# Patient Record
Sex: MALE | Race: WHITE | NOT HISPANIC OR LATINO | Employment: OTHER | URBAN - METROPOLITAN AREA
[De-identification: names, ages, dates, MRNs, and addresses within clinical notes are randomized per-mention and may not be internally consistent; named-entity substitution may affect disease eponyms.]

---

## 2017-05-15 ENCOUNTER — APPOINTMENT (OUTPATIENT)
Dept: LAB | Facility: CLINIC | Age: 82
End: 2017-05-15
Payer: MEDICARE

## 2017-05-15 ENCOUNTER — TRANSCRIBE ORDERS (OUTPATIENT)
Dept: LAB | Facility: CLINIC | Age: 82
End: 2017-05-15

## 2017-05-15 DIAGNOSIS — C61 MALIGNANT NEOPLASM OF PROSTATE (HCC): Primary | ICD-10-CM

## 2017-05-15 DIAGNOSIS — C61 MALIGNANT NEOPLASM OF PROSTATE (HCC): ICD-10-CM

## 2017-05-15 LAB — PSA SERPL-MCNC: 36.8 NG/ML (ref 0–4)

## 2017-05-15 PROCEDURE — 84153 ASSAY OF PSA TOTAL: CPT

## 2017-05-25 ENCOUNTER — GENERIC CONVERSION - ENCOUNTER (OUTPATIENT)
Dept: OTHER | Facility: OTHER | Age: 82
End: 2017-05-25

## 2017-06-01 ENCOUNTER — TRANSCRIBE ORDERS (OUTPATIENT)
Dept: MRI IMAGING | Facility: CLINIC | Age: 82
End: 2017-06-01

## 2017-06-01 ENCOUNTER — APPOINTMENT (OUTPATIENT)
Dept: LAB | Facility: CLINIC | Age: 82
End: 2017-06-01
Payer: MEDICARE

## 2017-06-01 DIAGNOSIS — E11.9 TYPE 2 DIABETES MELLITUS WITHOUT COMPLICATIONS (HCC): ICD-10-CM

## 2017-06-01 DIAGNOSIS — D47.2 MONOCLONAL PARAPROTEINEMIA: Primary | ICD-10-CM

## 2017-06-01 DIAGNOSIS — D47.2 MONOCLONAL PARAPROTEINEMIA: ICD-10-CM

## 2017-06-01 DIAGNOSIS — I10 ESSENTIAL (PRIMARY) HYPERTENSION: ICD-10-CM

## 2017-06-01 DIAGNOSIS — E78.5 HYPERLIPIDEMIA: ICD-10-CM

## 2017-06-01 DIAGNOSIS — E03.9 HYPOTHYROIDISM: ICD-10-CM

## 2017-06-01 DIAGNOSIS — D47.2 MONOCLONAL GAMMOPATHY: ICD-10-CM

## 2017-06-01 LAB
ALBUMIN SERPL BCP-MCNC: 3.8 G/DL (ref 3.5–5)
ALP SERPL-CCNC: 102 U/L (ref 46–116)
ALT SERPL W P-5'-P-CCNC: 22 U/L (ref 12–78)
ANION GAP SERPL CALCULATED.3IONS-SCNC: 5 MMOL/L (ref 4–13)
AST SERPL W P-5'-P-CCNC: 24 U/L (ref 5–45)
BASOPHILS # BLD AUTO: 0.06 THOUSANDS/ΜL (ref 0–0.1)
BASOPHILS NFR BLD AUTO: 1 % (ref 0–1)
BILIRUB DIRECT SERPL-MCNC: 0.14 MG/DL (ref 0–0.2)
BILIRUB SERPL-MCNC: 0.4 MG/DL (ref 0.2–1)
BUN SERPL-MCNC: 34 MG/DL (ref 5–25)
CALCIUM SERPL-MCNC: 9.6 MG/DL (ref 8.3–10.1)
CHLORIDE SERPL-SCNC: 109 MMOL/L (ref 100–108)
CHOLEST SERPL-MCNC: 139 MG/DL (ref 50–200)
CO2 SERPL-SCNC: 27 MMOL/L (ref 21–32)
CREAT SERPL-MCNC: 1.52 MG/DL (ref 0.6–1.3)
EOSINOPHIL # BLD AUTO: 0.41 THOUSAND/ΜL (ref 0–0.61)
EOSINOPHIL NFR BLD AUTO: 6 % (ref 0–6)
ERYTHROCYTE [DISTWIDTH] IN BLOOD BY AUTOMATED COUNT: 13.9 % (ref 11.6–15.1)
EST. AVERAGE GLUCOSE BLD GHB EST-MCNC: 120 MG/DL
GFR SERPL CREATININE-BSD FRML MDRD: 43.8 ML/MIN/1.73SQ M
GLUCOSE P FAST SERPL-MCNC: 101 MG/DL (ref 65–99)
HBA1C MFR BLD: 5.8 % (ref 4.2–6.3)
HCT VFR BLD AUTO: 36 % (ref 36.5–49.3)
HDLC SERPL-MCNC: 43 MG/DL (ref 40–60)
HGB BLD-MCNC: 11.8 G/DL (ref 12–17)
LDLC SERPL CALC-MCNC: 77 MG/DL (ref 0–100)
LYMPHOCYTES # BLD AUTO: 1.59 THOUSANDS/ΜL (ref 0.6–4.47)
LYMPHOCYTES NFR BLD AUTO: 23 % (ref 14–44)
MCH RBC QN AUTO: 31.6 PG (ref 26.8–34.3)
MCHC RBC AUTO-ENTMCNC: 32.8 G/DL (ref 31.4–37.4)
MCV RBC AUTO: 97 FL (ref 82–98)
MONOCYTES # BLD AUTO: 0.74 THOUSAND/ΜL (ref 0.17–1.22)
MONOCYTES NFR BLD AUTO: 11 % (ref 4–12)
NEUTROPHILS # BLD AUTO: 4.13 THOUSANDS/ΜL (ref 1.85–7.62)
NEUTS SEG NFR BLD AUTO: 59 % (ref 43–75)
NRBC BLD AUTO-RTO: 0 /100 WBCS
PLATELET # BLD AUTO: 292 THOUSANDS/UL (ref 149–390)
PMV BLD AUTO: 10.2 FL (ref 8.9–12.7)
POTASSIUM SERPL-SCNC: 4.3 MMOL/L (ref 3.5–5.3)
PROT SERPL-MCNC: 8.9 G/DL (ref 6.4–8.2)
RBC # BLD AUTO: 3.73 MILLION/UL (ref 3.88–5.62)
SODIUM SERPL-SCNC: 141 MMOL/L (ref 136–145)
TRIGL SERPL-MCNC: 96 MG/DL
TSH SERPL DL<=0.05 MIU/L-ACNC: 5.06 UIU/ML (ref 0.36–3.74)
WBC # BLD AUTO: 6.95 THOUSAND/UL (ref 4.31–10.16)

## 2017-06-01 PROCEDURE — 36415 COLL VENOUS BLD VENIPUNCTURE: CPT

## 2017-06-01 PROCEDURE — 84165 PROTEIN E-PHORESIS SERUM: CPT

## 2017-06-01 PROCEDURE — 80061 LIPID PANEL: CPT

## 2017-06-01 PROCEDURE — 83883 ASSAY NEPHELOMETRY NOT SPEC: CPT

## 2017-06-01 PROCEDURE — 83036 HEMOGLOBIN GLYCOSYLATED A1C: CPT

## 2017-06-01 PROCEDURE — 80048 BASIC METABOLIC PNL TOTAL CA: CPT

## 2017-06-01 PROCEDURE — 84166 PROTEIN E-PHORESIS/URINE/CSF: CPT

## 2017-06-01 PROCEDURE — 80076 HEPATIC FUNCTION PANEL: CPT

## 2017-06-01 PROCEDURE — 84443 ASSAY THYROID STIM HORMONE: CPT

## 2017-06-01 PROCEDURE — 85025 COMPLETE CBC W/AUTO DIFF WBC: CPT

## 2017-06-02 ENCOUNTER — HOSPITAL ENCOUNTER (OUTPATIENT)
Dept: RADIOLOGY | Facility: CLINIC | Age: 82
Discharge: HOME | End: 2017-06-02
Payer: MEDICARE

## 2017-06-02 DIAGNOSIS — R07.81 PLEURODYNIA: ICD-10-CM

## 2017-06-02 LAB
ALBUMIN SERPL ELPH-MCNC: 4.42 G/DL (ref 3.5–5)
ALBUMIN SERPL ELPH-MCNC: 49.7 % (ref 52–65)
ALBUMIN UR ELPH-MCNC: 100 %
ALPHA1 GLOB MFR UR ELPH: 0 %
ALPHA1 GLOB SERPL ELPH-MCNC: 0.41 G/DL (ref 0.1–0.4)
ALPHA1 GLOB SERPL ELPH-MCNC: 4.6 % (ref 2.5–5)
ALPHA2 GLOB MFR UR ELPH: 0 %
ALPHA2 GLOB SERPL ELPH-MCNC: 1 G/DL (ref 0.4–1.2)
ALPHA2 GLOB SERPL ELPH-MCNC: 11.2 % (ref 7–13)
B-GLOBULIN MFR UR ELPH: 0 %
BETA GLOB ABNORMAL SERPL ELPH-MCNC: 0.47 G/DL (ref 0.4–0.8)
BETA1 GLOB SERPL ELPH-MCNC: 5.3 % (ref 5–13)
BETA2 GLOB SERPL ELPH-MCNC: 5.3 % (ref 2–8)
BETA2+GAMMA GLOB SERPL ELPH-MCNC: 0.47 G/DL (ref 0.2–0.5)
GAMMA GLOB ABNORMAL SERPL ELPH-MCNC: 2.13 G/DL (ref 0.5–1.6)
GAMMA GLOB MFR UR ELPH: 0 %
GAMMA GLOB SERPL ELPH-MCNC: 23.9 % (ref 12–22)
IGG/ALB SER: 0.99 {RATIO} (ref 1.1–1.8)
KAPPA LC FREE SER-MCNC: 142.46 MG/L (ref 3.3–19.4)
KAPPA LC FREE/LAMBDA FREE SER: 2.47 {RATIO} (ref 0.26–1.65)
LAMBDA LC FREE SERPL-MCNC: 57.62 MG/L (ref 5.71–26.3)
M PROTEIN 1 MFR SERPL ELPH: 6.2 %
M PROTEIN 1 SERPL ELPH-MCNC: 0.55 G/DL
PROT PATTERN UR ELPH-IMP: NORMAL
PROT SERPL-MCNC: 8.9 G/DL (ref 6.4–8.2)
PROT UR-MCNC: 10 MG/DL

## 2017-06-02 PROCEDURE — 71101 X-RAY EXAM UNILAT RIBS/CHEST: CPT

## 2017-06-13 ENCOUNTER — ALLSCRIPTS OFFICE VISIT (OUTPATIENT)
Dept: OTHER | Facility: OTHER | Age: 82
End: 2017-06-13

## 2017-07-18 ENCOUNTER — GENERIC CONVERSION - ENCOUNTER (OUTPATIENT)
Dept: OTHER | Facility: OTHER | Age: 82
End: 2017-07-18

## 2017-07-18 ENCOUNTER — GENERIC CONVERSION - ENCOUNTER (OUTPATIENT)
Dept: INTERNAL MEDICINE CLINIC | Facility: CLINIC | Age: 82
End: 2017-07-18

## 2017-08-29 ENCOUNTER — GENERIC CONVERSION - ENCOUNTER (OUTPATIENT)
Dept: OTHER | Facility: OTHER | Age: 82
End: 2017-08-29

## 2017-12-02 ENCOUNTER — APPOINTMENT (OUTPATIENT)
Dept: LAB | Facility: CLINIC | Age: 82
End: 2017-12-02
Payer: MEDICARE

## 2017-12-02 DIAGNOSIS — I10 ESSENTIAL (PRIMARY) HYPERTENSION: ICD-10-CM

## 2017-12-02 DIAGNOSIS — E78.5 HYPERLIPIDEMIA: ICD-10-CM

## 2017-12-02 DIAGNOSIS — D47.2 MONOCLONAL GAMMOPATHY: ICD-10-CM

## 2017-12-02 DIAGNOSIS — C61 MALIGNANT NEOPLASM OF PROSTATE (HCC): ICD-10-CM

## 2017-12-02 DIAGNOSIS — E03.9 HYPOTHYROIDISM: ICD-10-CM

## 2017-12-02 DIAGNOSIS — E11.9 TYPE 2 DIABETES MELLITUS WITHOUT COMPLICATIONS (HCC): ICD-10-CM

## 2017-12-02 LAB
ALBUMIN SERPL BCP-MCNC: 3.5 G/DL (ref 3.5–5)
ALP SERPL-CCNC: 96 U/L (ref 46–116)
ALT SERPL W P-5'-P-CCNC: 21 U/L (ref 12–78)
ANION GAP SERPL CALCULATED.3IONS-SCNC: 5 MMOL/L (ref 4–13)
AST SERPL W P-5'-P-CCNC: 24 U/L (ref 5–45)
BASOPHILS # BLD AUTO: 0.06 THOUSANDS/ΜL (ref 0–0.1)
BASOPHILS NFR BLD AUTO: 1 % (ref 0–1)
BILIRUB DIRECT SERPL-MCNC: 0.11 MG/DL (ref 0–0.2)
BILIRUB SERPL-MCNC: 0.39 MG/DL (ref 0.2–1)
BUN SERPL-MCNC: 19 MG/DL (ref 5–25)
CALCIUM SERPL-MCNC: 9.1 MG/DL (ref 8.3–10.1)
CHLORIDE SERPL-SCNC: 107 MMOL/L (ref 100–108)
CHOLEST SERPL-MCNC: 146 MG/DL (ref 50–200)
CO2 SERPL-SCNC: 26 MMOL/L (ref 21–32)
CREAT SERPL-MCNC: 1.33 MG/DL (ref 0.6–1.3)
EOSINOPHIL # BLD AUTO: 0.43 THOUSAND/ΜL (ref 0–0.61)
EOSINOPHIL NFR BLD AUTO: 7 % (ref 0–6)
ERYTHROCYTE [DISTWIDTH] IN BLOOD BY AUTOMATED COUNT: 14.1 % (ref 11.6–15.1)
EST. AVERAGE GLUCOSE BLD GHB EST-MCNC: 120 MG/DL
GFR SERPL CREATININE-BSD FRML MDRD: 48 ML/MIN/1.73SQ M
GLUCOSE P FAST SERPL-MCNC: 87 MG/DL (ref 65–99)
HBA1C MFR BLD: 5.8 % (ref 4.2–6.3)
HCT VFR BLD AUTO: 34.1 % (ref 36.5–49.3)
HDLC SERPL-MCNC: 47 MG/DL (ref 40–60)
HGB BLD-MCNC: 11.3 G/DL (ref 12–17)
IGG SERPL-MCNC: 1900 MG/DL (ref 700–1600)
LDLC SERPL CALC-MCNC: 76 MG/DL (ref 0–100)
LYMPHOCYTES # BLD AUTO: 1.47 THOUSANDS/ΜL (ref 0.6–4.47)
LYMPHOCYTES NFR BLD AUTO: 24 % (ref 14–44)
MCH RBC QN AUTO: 31.4 PG (ref 26.8–34.3)
MCHC RBC AUTO-ENTMCNC: 33.1 G/DL (ref 31.4–37.4)
MCV RBC AUTO: 95 FL (ref 82–98)
MONOCYTES # BLD AUTO: 0.6 THOUSAND/ΜL (ref 0.17–1.22)
MONOCYTES NFR BLD AUTO: 10 % (ref 4–12)
NEUTROPHILS # BLD AUTO: 3.5 THOUSANDS/ΜL (ref 1.85–7.62)
NEUTS SEG NFR BLD AUTO: 58 % (ref 43–75)
NRBC BLD AUTO-RTO: 0 /100 WBCS
PLATELET # BLD AUTO: 311 THOUSANDS/UL (ref 149–390)
PMV BLD AUTO: 9.8 FL (ref 8.9–12.7)
POTASSIUM SERPL-SCNC: 4.3 MMOL/L (ref 3.5–5.3)
PROT SERPL-MCNC: 8.7 G/DL (ref 6.4–8.2)
RBC # BLD AUTO: 3.6 MILLION/UL (ref 3.88–5.62)
SODIUM SERPL-SCNC: 138 MMOL/L (ref 136–145)
T4 FREE SERPL-MCNC: 0.86 NG/DL (ref 0.76–1.46)
TRIGL SERPL-MCNC: 115 MG/DL
TSH SERPL DL<=0.05 MIU/L-ACNC: 8.5 UIU/ML (ref 0.36–3.74)
WBC # BLD AUTO: 6.09 THOUSAND/UL (ref 4.31–10.16)

## 2017-12-02 PROCEDURE — 80061 LIPID PANEL: CPT

## 2017-12-02 PROCEDURE — 80048 BASIC METABOLIC PNL TOTAL CA: CPT

## 2017-12-02 PROCEDURE — 85025 COMPLETE CBC W/AUTO DIFF WBC: CPT

## 2017-12-02 PROCEDURE — 84166 PROTEIN E-PHORESIS/URINE/CSF: CPT

## 2017-12-02 PROCEDURE — 83036 HEMOGLOBIN GLYCOSYLATED A1C: CPT

## 2017-12-02 PROCEDURE — 84165 PROTEIN E-PHORESIS SERUM: CPT

## 2017-12-02 PROCEDURE — 82784 ASSAY IGA/IGD/IGG/IGM EACH: CPT

## 2017-12-02 PROCEDURE — 80076 HEPATIC FUNCTION PANEL: CPT

## 2017-12-02 PROCEDURE — 84443 ASSAY THYROID STIM HORMONE: CPT

## 2017-12-02 PROCEDURE — 86334 IMMUNOFIX E-PHORESIS SERUM: CPT

## 2017-12-02 PROCEDURE — 36415 COLL VENOUS BLD VENIPUNCTURE: CPT

## 2017-12-02 PROCEDURE — 84439 ASSAY OF FREE THYROXINE: CPT

## 2017-12-04 LAB
ALBUMIN SERPL ELPH-MCNC: 3.83 G/DL (ref 3.5–5)
ALBUMIN SERPL ELPH-MCNC: 47.3 % (ref 52–65)
ALPHA1 GLOB SERPL ELPH-MCNC: 0.39 G/DL (ref 0.1–0.4)
ALPHA1 GLOB SERPL ELPH-MCNC: 4.8 % (ref 2.5–5)
ALPHA2 GLOB SERPL ELPH-MCNC: 0.92 G/DL (ref 0.4–1.2)
ALPHA2 GLOB SERPL ELPH-MCNC: 11.3 % (ref 7–13)
BETA GLOB ABNORMAL SERPL ELPH-MCNC: 0.41 G/DL (ref 0.4–0.8)
BETA1 GLOB SERPL ELPH-MCNC: 5.1 % (ref 5–13)
BETA2 GLOB SERPL ELPH-MCNC: 6.1 % (ref 2–8)
BETA2+GAMMA GLOB SERPL ELPH-MCNC: 0.49 G/DL (ref 0.2–0.5)
GAMMA GLOB ABNORMAL SERPL ELPH-MCNC: 2.06 G/DL (ref 0.5–1.6)
GAMMA GLOB SERPL ELPH-MCNC: 25.4 % (ref 12–22)
IGG/ALB SER: 0.9 {RATIO} (ref 1.1–1.8)
INTERPRETATION UR IFE-IMP: NORMAL
M PROTEIN 1 MFR SERPL ELPH: 5.9 %
M PROTEIN 1 SERPL ELPH-MCNC: 0.48 G/DL
PROT SERPL-MCNC: 8.1 G/DL (ref 6.4–8.2)

## 2017-12-05 LAB
ALBUMIN UR ELPH-MCNC: 100 %
ALPHA1 GLOB MFR UR ELPH: 0 %
ALPHA2 GLOB MFR UR ELPH: 0 %
B-GLOBULIN MFR UR ELPH: 0 %
GAMMA GLOB MFR UR ELPH: 0 %
PROT PATTERN UR ELPH-IMP: NORMAL
PROT UR-MCNC: 10 MG/DL

## 2017-12-18 ENCOUNTER — ALLSCRIPTS OFFICE VISIT (OUTPATIENT)
Dept: OTHER | Facility: OTHER | Age: 82
End: 2017-12-18

## 2017-12-19 NOTE — PROGRESS NOTES
Assessment  1  Advance directive discussed with patient (V65 49) (Z71 89)   2  Atherosclerosis of coronary artery (414 00) (I25 10)   3  Chronic kidney disease (CKD) (585 9) (N18 9)   4  Chronic obstructive pulmonary disease (496) (J44 9)   5  Hyperlipidemia (272 4) (E78 5)   6  Hypertension (401 9) (I10)   7  MGUS (monoclonal gammopathy of unknown significance) (273 1) (D47 2)   8  Subclinical hypothyroidism (244 8) (E03 9)   9  Type 2 diabetes mellitus (250 00) (E11 9)    Plan  Health Maintenance    · Potassium Chloride Padmini ER 20 MEQ Oral Tablet Extended Release; Take 1 tablet daily  Hyperlipidemia    · (1) LIPID PANEL, FASTING; Status:Active; Requested CZU:[id number];   Hypertension    · Valsartan 80 MG Oral Tablet; take 1 tablet by mouth every day   · (1) BASIC METABOLIC PROFILE; Status:Active; Requested IGI:[id number];    · (1) CBC/PLT/DIFF; Status:Active; Requested VPI:[id number];    · (1) HEPATIC FUNCTION PANEL; Status:Active; Requested HWS:[id number];   MGUS (monoclonal gammopathy of unknown significance)    · (1) PROTEIN ELECTRO, SERUM; Status:Active; Requested IEH:[id number];    · (1) PROTEIN ELECTRO, URINE; Status:Active; Requested KFY:[id number];    · (Q) FREE KAPPA & LAMBDA WITH K/L RATIO, SERUM; Status:Active; Requested FYB:[id number]; Subclinical hypothyroidism    · (1) TSH; Status:Active; Requested XDD:[id number];   Type 2 diabetes mellitus    · (1) HEMOGLOBIN A1C; Status:Active; Requested DGH:[id number];    · (1) MICROALBUMIN CREATININE RATIO, RANDOM URINE; Status:Active; Requested CNH:[id number]; Discussion/Summary  Discussion Summary:   Lab data reviewed in detail and compared priorAdvanced dementia-I recommend contacting [facility] to check for available programs or wavers to get assistance  Signs and symptoms to watch for discussed   Assisted-living remains another optionCoronary artery disease stable, continue to modify risk factorsHypertension and hyperlipidemia stable on present regimenChronic kidney disease -serum creatinine is stableMGUS -labs reviewed and stableDiet-controlled type 2 diabetes remained stableRoutine follow-up 6 months, but sooner as neededPOLST and advanced directives discussed  Medication SE Review and Pt Understands Tx: Possible side effects of new medications were reviewed with the patient/guardian today  The treatment plan was reviewed with the patient/guardian  The patient/guardian understands and agrees with the treatment plan      Chief Complaint  Chief Complaint Chronic Condition  [place]: Patient is here today for follow up of chronic conditions described in HPI  History of Present Illness  HPI: Wife notes significant decline recently  Grandiose delusinos and paranoid behaviour, hiding things, rearranging things from freezer to closet  No aggressive behavior  No wandering  Some nights he sleeps, other times he is up at night  His daughter comes once per week to bath him and give Lesta Mon a respit  Pt is incontinent of bowel and bladder, but ambulates w/o asist  No falls  They never filled out POLST  Pt unable to give any reliable history  Review of Systems  Complete-Male:  Constitutional: No fever or chills, feels well, no tiredness, no recent weight gain or weight loss  Eyes: No complaints of eye pain, no red eyes, no discharge from eyes, no itchy eyes  ENT: no complaints of earache, no hearing loss, no nosebleeds, no nasal discharge, no sore throat, no hoarseness  Cardiovascular: No complaints of slow heart rate, no fast heart rate, no chest pain, no palpitations, no leg claudication, no lower extremity  Respiratory: shortness of breath during exertion, but-- no shortness of breath,-- no cough,-- no orthopnea,-- no wheezing-- and-- no PND  Gastrointestinal: constipation, but-- no abdominal pain,-- no nausea,-- no vomiting,-- no diarrhea-- and-- no blood in stools    Genitourinary: No complaints of dysuria, no incontinence, no hesitancy, no nocturia, no genital lesion, no testicular pain  Musculoskeletal: No complaints of arthralgia, no myalgias, no joint swelling or stiffness, no limb pain or swelling  Integumentary: No complaints of skin rash or skin lesions, no itching, no skin wound, no dry skin  Neurological: No compliants of headache, no confusion, no convulsions, no numbness or tingling, no dizziness or fainting, no limb weakness, no difficulty walking  Psychiatric: Is not suicidal, no sleep disturbances, no anxiety or depression, no change in personality, no emotional problems  Endocrine: No complaints of proptosis, no hot flashes, no muscle weakness, no erectile dysfunction, no deepening of the voice, no feelings of weakness  Hematologic/Lymphatic: No complaints of swollen glands, no swollen glands in the neck, does not bleed easily, no easy bruising  Active Problems  1  Acquired stenosis of aortic valve (424 1) (I35 0)   2  Acute pain of right knee (719 46) (M25 561)   3  Advance directive discussed with patient (V65 49) (Z71 89)   4  Aortic valve disorder (424 1) (I35 9)   5  Atherosclerosis of coronary artery (414 00) (I25 10)   6  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)   7  Chronic bronchitis (491 9) (J42)   8  Chronic kidney disease (CKD) (585 9) (N18 9)   9  Chronic obstructive pulmonary disease (496) (J44 9)   10  Constipation (564 00) (K59 00)   11  Coronary arteriosclerosis (414 00) (I25 10)   12  Difficulty breathing (786 09) (R06 89)   13  Elevated serum protein level (790 99) (R77 9)   14  Encounter for screening for osteoporosis (V82 81) (Z13 820)   15  Flu vaccine need (V04 81) (Z23)   16  Gait disturbance (781 2) (R26 9)   17  History of colonic polyps (V12 72) (Z86 010)   18  Hyperlipidemia (272 4) (E78 5)   19  Hypertension (401 9) (I10)   20  Left shoulder pain (719 41) (M25 512)   21  Lung nodule seen on imaging study (793 11) (R91 1)   22  MGUS (monoclonal gammopathy of unknown significance) (273 1) (D47 2)   23   Osteopenia (733 90) (M85 80)   24  Other nonspecific abnormal finding of lung field (793 19) (R91 8)   25  Prostate cancer (185) (C61)   26  Pulmonary emphysema (492 8) (J43 9)   27  Rectal bleeding (569 3) (K62 5)   28  Rib pain (786 50) (R07 81)   29  Screening for genitourinary condition (V81 6) (Z13 89)   30  Shortness of breath (786 05) (R06 02)   31  Subclinical hypothyroidism (244 8) (E03 9)   32  Tubular adenoma of colon (211 3) (D12 6)   33  Type 2 diabetes mellitus (250 00) (E11 9)    Past Medical History  1  History of Abnormal bleeding in menstrual cycle (626 9) (N93 9)   2  Aortic valve disorder (424 1) (I35 9)   3  Chronic obstructive pulmonary disease (496) (J44 9)   4  History of Flu vaccine need (V04 81) (Z23)   5  History of abnormal weight loss (V13 89) (Z87 898)   6  History of cataract (V12 49) (Z86 69)   7  History of hyperglycemia (V12 29) (Z86 39)   8  History of hypertension (V12 59) (Z86 79)   9  History of orthostatic hypotension (V12 59) (Z86 79)   10  Hyperlipidemia (272 4) (E78 5)   11  Personal history of coronary atherosclerosis (V12 59) (Z86 79)   12  History of Pulmonary disease (518 89) (J98 4)   13  History of Senile dementia, uncomplicated (121 2) (D94 22)   14  Type 2 diabetes mellitus (250 00) (E11 9)  Active Problems And Past Medical History Reviewed: The active problems and past medical history were reviewed and updated today  Surgical History  1  History of CABG   2  History of Cardiac Cath Procedure Outcome: Successful   3  History of Cataract Surgery   4  History of Gastric Surgery For Morbid Obesity  Surgical History Reviewed: The surgical history was reviewed and updated today  Family History  Mother    1  Family history of hyperlipidemia (V18 19) (Z83 49)   2  Family history of Father  At Age ___   3  Family history of Mother  At Age ___  Father    3  Family history of hyperlipidemia (V18 19) (Z83 49)  Daughter    5   Family history of thyroid disease (V18 19) (Z83 49)  Brother    6  Family history of skin cancer (V16 8) (Z80 8)  Family History    7  Family history of Cancer  Family History Reviewed: The family history was reviewed and updated today  Social History   · Former smoker (Z24 55) (Y66 358)   · Non smoker / tobacco user (V49 89) (Z78 9)   · Social alcohol use (Z78 9)  Social History Reviewed: The social history was reviewed and updated today  Current Meds   1  Atorvastatin Calcium 40 MG Oral Tablet; Take 1 tablet by mouth  daily; Therapy: 15TUD9221 to (Napoleon Ash)  Requested for: 48YEI2588; Last Rx:18Ofn0063 Ordered   2  Centrum Silver Oral Tablet; TAKE 1 TABLET DAILY; Therapy: (Recorded:08Jan2014) to Recorded   3  Furosemide 40 MG Oral Tablet (Lasix); TAKE 1 TABLET DAILY  Requested for: 61WUN4022; Last Rx:97Qww1025 Ordered   4  Ipratropium-Albuterol 0 5-2 5 (3) MG/3ML Inhalation Solution; USE 1 UNIT DOSE IN NEBULIZER 4 TIMES DAILY; Last Rx:25Rty3151 Ordered   5  Metoprolol Succinate ER 25 MG Oral Tablet Extended Release 24 Hour; Take 1 tablet by mouth  every day; Therapy: 23WRR3075 to (Evaluate:70Ibt4331)  Requested for: 87TBY3742; Last Rx:59Clc3512 Ordered   6  Potassium Chloride Padmini ER 20 MEQ Oral Tablet Extended Release; Take 1 tablet daily  Requested for: 59AKB4004; Last Rx:24Myq6283 Ordered   7  ProAir  (90 Base) MCG/ACT Inhalation Aerosol Solution; INHALE 1 TO 2 PUFFS EVERY 6 HOURS AS NEEDED  Requested for: 46ZIH0266; Last Rx:31Lyt2393 Ordered   8  Stool Softener 100 MG Oral Tablet; TAKE 1 TABLET DAILY AS DIRECTED; Therapy: (Recorded:08Jan2014) to Recorded   9  Valsartan 80 MG Oral Tablet; take 1 tablet by mouth every day; Therapy: 53ZMC9726 to (Last Rx:12Zlk6045)  Requested for: 25KYX2059 Ordered   10  Vitamin D 1000 UNIT Oral Tablet Recorded  Medication List Reviewed: The medication list was reviewed and updated today  Allergies  1  Exelon PT24   2  Namenda TABS   3   Nitrostat SUBL    Vitals  Vital Signs Recorded: 10DNX7695 03:08PM   Heart Rate 60   Respiration 18   Systolic 054   Diastolic 40   Height 6 ft    Weight 144 lb    BMI Calculated 19 53   BSA Calculated 1 85     Physical Exam   Constitutional  General appearance: Abnormal  -- elderly and chronically ill appearing  Eyes  Conjunctiva and lids: No swelling, erythema, or discharge  Pupils and irises: Equal, round and reactive to light  Ears, Nose, Mouth, and Throat  External inspection of ears and nose: Normal    Nasal mucosa, septum, and turbinates: Normal without edema or erythema  Oropharynx: Normal with no erythema, edema, exudate or lesions  Pulmonary  Respiratory effort: No increased work of breathing or signs of respiratory distress  Auscultation of lungs: Clear to auscultation, equal breath sounds bilaterally, no wheezes, no rales, no rhonci  Cardiovascular  Auscultation of heart: Abnormal  -- 3/6 jewel  Examination of extremities for edema and/or varicosities: Normal    Carotid pulses: Normal    Lymphatic  Palpation of lymph nodes in neck: No lymphadenopathy  Musculoskeletal  Gait and station: Normal    Skin  Skin and subcutaneous tissue: Normal without rashes or lesions  Neurologic  Cranial nerves: Cranial nerves 2-12 intact  Psychiatric  Orientation to person, place and time: Normal    Mood and affect: Normal    Diabetic Foot Screen: Normal    Socks and shoes removed, the Right Foot: the foot was normal, no swelling, no erythema  The toes on the right were normal   Socks and shoes removed, Left Foot: the foot was normal, no swelling, no erythema  The toes on the left were normal -- unable to assess sensation d/t dementia  Results/Data  *VB - Foot Exam 75NRY6616 03:06PM Alvina Hilario     Test Name Result Flag Reference   FOOT EXAM [id number]         Health Management  Constipation   COLONOSCOPY; every 5 years; Last 70ZPE8669; Next Due: 38LYC2356;  Active  Health Maintenance   *VB - Fall Risk Assessment  (Dx Z13 89 Screen for Neurologic Disorder); every 1 year; [id number]; Next Due: 76GHS6851;  Overdue    Signatures   Electronically signed by : NITHYA Weiner ; Dec 18 2017  3:58PM EST                       (Author)

## 2018-01-12 VITALS
RESPIRATION RATE: 18 BRPM | SYSTOLIC BLOOD PRESSURE: 124 MMHG | DIASTOLIC BLOOD PRESSURE: 52 MMHG | HEIGHT: 72 IN | HEART RATE: 64 BPM | WEIGHT: 156.5 LBS | BODY MASS INDEX: 21.2 KG/M2

## 2018-01-12 NOTE — MISCELLANEOUS
To Whom It May Concern:     L is a patient under my care for multiple medical comorbidities  He has dementia and is not suitable to sit on a jury  Electronically signed by:Shay HUBBARD    Mar 22 2016 12:21PM EST

## 2018-01-23 VITALS
WEIGHT: 144 LBS | BODY MASS INDEX: 19.5 KG/M2 | HEART RATE: 60 BPM | HEIGHT: 72 IN | DIASTOLIC BLOOD PRESSURE: 40 MMHG | SYSTOLIC BLOOD PRESSURE: 120 MMHG | RESPIRATION RATE: 18 BRPM

## 2018-01-26 ENCOUNTER — APPOINTMENT (INPATIENT)
Dept: CT IMAGING | Facility: HOSPITAL | Age: 83
DRG: 194 | End: 2018-01-26
Payer: MEDICARE

## 2018-01-26 ENCOUNTER — HOSPITAL ENCOUNTER (INPATIENT)
Facility: HOSPITAL | Age: 83
LOS: 3 days | Discharge: NURSING, REHAB OR LONG-TERM CARE | DRG: 194 | End: 2018-01-29
Attending: EMERGENCY MEDICINE | Admitting: FAMILY MEDICINE
Payer: MEDICARE

## 2018-01-26 ENCOUNTER — APPOINTMENT (EMERGENCY)
Dept: RADIOLOGY | Facility: HOSPITAL | Age: 83
DRG: 194 | End: 2018-01-26
Payer: MEDICARE

## 2018-01-26 DIAGNOSIS — K59.00 CONSTIPATION, UNSPECIFIED CONSTIPATION TYPE: ICD-10-CM

## 2018-01-26 DIAGNOSIS — J18.9 COMMUNITY ACQUIRED PNEUMONIA OF RIGHT UPPER LOBE OF LUNG: ICD-10-CM

## 2018-01-26 DIAGNOSIS — J18.9 RIGHT UPPER LOBE PNEUMONIA: Primary | ICD-10-CM

## 2018-01-26 DIAGNOSIS — M25.551 RIGHT HIP PAIN: ICD-10-CM

## 2018-01-26 PROBLEM — J44.9 COPD WITHOUT EXACERBATION (HCC): Status: ACTIVE | Noted: 2018-01-26

## 2018-01-26 PROBLEM — N17.9 ACUTE RENAL FAILURE (HCC): Status: ACTIVE | Noted: 2018-01-26

## 2018-01-26 PROBLEM — M25.559 HIP PAIN: Status: ACTIVE | Noted: 2018-01-26

## 2018-01-26 PROBLEM — F03.90 DEMENTIA (HCC): Status: ACTIVE | Noted: 2018-01-26

## 2018-01-26 PROBLEM — I25.10 CAD (CORONARY ARTERY DISEASE): Status: ACTIVE | Noted: 2018-01-26

## 2018-01-26 LAB
ALBUMIN SERPL BCP-MCNC: 3.3 G/DL (ref 3.5–5)
ALP SERPL-CCNC: 100 U/L (ref 46–116)
ALT SERPL W P-5'-P-CCNC: 37 U/L (ref 12–78)
ANION GAP SERPL CALCULATED.3IONS-SCNC: 13 MMOL/L (ref 4–13)
AST SERPL W P-5'-P-CCNC: 48 U/L (ref 5–45)
ATRIAL RATE: 102 BPM
BASOPHILS # BLD MANUAL: 0 THOUSAND/UL (ref 0–0.1)
BASOPHILS NFR MAR MANUAL: 0 % (ref 0–1)
BILIRUB SERPL-MCNC: 0.4 MG/DL (ref 0.2–1)
BILIRUB UR QL STRIP: NEGATIVE
BUN SERPL-MCNC: 38 MG/DL (ref 5–25)
CALCIUM SERPL-MCNC: 9.5 MG/DL (ref 8.3–10.1)
CHLORIDE SERPL-SCNC: 102 MMOL/L (ref 100–108)
CLARITY UR: CLEAR
CO2 SERPL-SCNC: 24 MMOL/L (ref 21–32)
COLOR UR: YELLOW
CREAT SERPL-MCNC: 1.66 MG/DL (ref 0.6–1.3)
EOSINOPHIL # BLD MANUAL: 0 THOUSAND/UL (ref 0–0.4)
EOSINOPHIL NFR BLD MANUAL: 0 % (ref 0–6)
ERYTHROCYTE [DISTWIDTH] IN BLOOD BY AUTOMATED COUNT: 13.3 % (ref 11.6–15.1)
GFR SERPL CREATININE-BSD FRML MDRD: 37 ML/MIN/1.73SQ M
GLUCOSE SERPL-MCNC: 143 MG/DL (ref 65–140)
GLUCOSE UR STRIP-MCNC: NEGATIVE MG/DL
HCT VFR BLD AUTO: 31.6 % (ref 36.5–49.3)
HGB BLD-MCNC: 10.4 G/DL (ref 12–17)
HGB UR QL STRIP.AUTO: NEGATIVE
KETONES UR STRIP-MCNC: NEGATIVE MG/DL
LACTATE SERPL-SCNC: 1.2 MMOL/L (ref 0.5–2)
LEUKOCYTE ESTERASE UR QL STRIP: NEGATIVE
LYMPHOCYTES # BLD AUTO: 11 % (ref 14–44)
LYMPHOCYTES # BLD AUTO: 2.17 THOUSAND/UL (ref 0.6–4.47)
MCH RBC QN AUTO: 31.5 PG (ref 26.8–34.3)
MCHC RBC AUTO-ENTMCNC: 32.9 G/DL (ref 31.4–37.4)
MCV RBC AUTO: 96 FL (ref 82–98)
MONOCYTES # BLD AUTO: 0.98 THOUSAND/UL (ref 0–1.22)
MONOCYTES NFR BLD: 5 % (ref 4–12)
NEUTROPHILS # BLD MANUAL: 16.54 THOUSAND/UL (ref 1.85–7.62)
NEUTS SEG NFR BLD AUTO: 84 % (ref 43–75)
NITRITE UR QL STRIP: NEGATIVE
NRBC BLD AUTO-RTO: 0 /100 WBCS
PH UR STRIP.AUTO: 5.5 [PH] (ref 4.5–8)
PLATELET # BLD AUTO: 320 THOUSANDS/UL (ref 149–390)
PLATELET BLD QL SMEAR: ADEQUATE
PMV BLD AUTO: 9.6 FL (ref 8.9–12.7)
POTASSIUM SERPL-SCNC: 4.2 MMOL/L (ref 3.5–5.3)
PROT SERPL-MCNC: 9.3 G/DL (ref 6.4–8.2)
PROT UR STRIP-MCNC: NEGATIVE MG/DL
QRS AXIS: -75 DEGREES
QRSD INTERVAL: 112 MS
QT INTERVAL: 354 MS
QTC INTERVAL: 465 MS
RBC # BLD AUTO: 3.3 MILLION/UL (ref 3.88–5.62)
SODIUM SERPL-SCNC: 139 MMOL/L (ref 136–145)
SP GR UR STRIP.AUTO: 1.01 (ref 1–1.03)
T WAVE AXIS: 75 DEGREES
TOTAL CELLS COUNTED SPEC: 100
TROPONIN I SERPL-MCNC: 0.02 NG/ML
TSH SERPL DL<=0.05 MIU/L-ACNC: 3.38 UIU/ML (ref 0.36–3.74)
UROBILINOGEN UR QL STRIP.AUTO: 0.2 E.U./DL
VENTRICULAR RATE: 104 BPM
WBC # BLD AUTO: 19.69 THOUSAND/UL (ref 4.31–10.16)

## 2018-01-26 PROCEDURE — 81003 URINALYSIS AUTO W/O SCOPE: CPT | Performed by: EMERGENCY MEDICINE

## 2018-01-26 PROCEDURE — 96374 THER/PROPH/DIAG INJ IV PUSH: CPT

## 2018-01-26 PROCEDURE — 73502 X-RAY EXAM HIP UNI 2-3 VIEWS: CPT

## 2018-01-26 PROCEDURE — 74176 CT ABD & PELVIS W/O CONTRAST: CPT

## 2018-01-26 PROCEDURE — 85007 BL SMEAR W/DIFF WBC COUNT: CPT | Performed by: EMERGENCY MEDICINE

## 2018-01-26 PROCEDURE — 93005 ELECTROCARDIOGRAM TRACING: CPT

## 2018-01-26 PROCEDURE — 83605 ASSAY OF LACTIC ACID: CPT | Performed by: EMERGENCY MEDICINE

## 2018-01-26 PROCEDURE — 80053 COMPREHEN METABOLIC PANEL: CPT | Performed by: EMERGENCY MEDICINE

## 2018-01-26 PROCEDURE — 71045 X-RAY EXAM CHEST 1 VIEW: CPT

## 2018-01-26 PROCEDURE — 93010 ELECTROCARDIOGRAM REPORT: CPT | Performed by: INTERNAL MEDICINE

## 2018-01-26 PROCEDURE — 36415 COLL VENOUS BLD VENIPUNCTURE: CPT | Performed by: EMERGENCY MEDICINE

## 2018-01-26 PROCEDURE — 84443 ASSAY THYROID STIM HORMONE: CPT | Performed by: EMERGENCY MEDICINE

## 2018-01-26 PROCEDURE — 96361 HYDRATE IV INFUSION ADD-ON: CPT

## 2018-01-26 PROCEDURE — 99223 1ST HOSP IP/OBS HIGH 75: CPT | Performed by: FAMILY MEDICINE

## 2018-01-26 PROCEDURE — 85027 COMPLETE CBC AUTOMATED: CPT | Performed by: EMERGENCY MEDICINE

## 2018-01-26 PROCEDURE — 87040 BLOOD CULTURE FOR BACTERIA: CPT | Performed by: EMERGENCY MEDICINE

## 2018-01-26 PROCEDURE — 84484 ASSAY OF TROPONIN QUANT: CPT | Performed by: EMERGENCY MEDICINE

## 2018-01-26 RX ORDER — ALBUTEROL SULFATE 90 UG/1
2 AEROSOL, METERED RESPIRATORY (INHALATION) EVERY 6 HOURS PRN
COMMUNITY
End: 2018-07-03

## 2018-01-26 RX ORDER — AZITHROMYCIN 250 MG/1
250 TABLET, FILM COATED ORAL EVERY 24 HOURS
Status: DISCONTINUED | OUTPATIENT
Start: 2018-01-27 | End: 2018-01-29 | Stop reason: HOSPADM

## 2018-01-26 RX ORDER — METOPROLOL SUCCINATE 25 MG/1
1 TABLET, EXTENDED RELEASE ORAL DAILY
COMMUNITY
Start: 2014-02-28 | End: 2018-01-29 | Stop reason: HOSPADM

## 2018-01-26 RX ORDER — ACETAMINOPHEN 325 MG/1
650 TABLET ORAL EVERY 6 HOURS PRN
Status: DISCONTINUED | OUTPATIENT
Start: 2018-01-26 | End: 2018-01-29 | Stop reason: HOSPADM

## 2018-01-26 RX ORDER — SODIUM CHLORIDE 9 MG/ML
75 INJECTION, SOLUTION INTRAVENOUS ONCE
Status: COMPLETED | OUTPATIENT
Start: 2018-01-26 | End: 2018-01-27

## 2018-01-26 RX ORDER — ONDANSETRON 2 MG/ML
4 INJECTION INTRAMUSCULAR; INTRAVENOUS EVERY 6 HOURS PRN
Status: DISCONTINUED | OUTPATIENT
Start: 2018-01-26 | End: 2018-01-29 | Stop reason: HOSPADM

## 2018-01-26 RX ORDER — POTASSIUM CHLORIDE 20 MEQ/1
1 TABLET, EXTENDED RELEASE ORAL DAILY
COMMUNITY

## 2018-01-26 RX ORDER — METOPROLOL SUCCINATE 25 MG/1
25 TABLET, EXTENDED RELEASE ORAL DAILY
Status: DISCONTINUED | OUTPATIENT
Start: 2018-01-27 | End: 2018-01-27

## 2018-01-26 RX ORDER — ASPIRIN 81 MG
1 TABLET, DELAYED RELEASE (ENTERIC COATED) ORAL DAILY
COMMUNITY

## 2018-01-26 RX ORDER — ACETAMINOPHEN 325 MG/1
975 TABLET ORAL ONCE
Status: COMPLETED | OUTPATIENT
Start: 2018-01-26 | End: 2018-01-26

## 2018-01-26 RX ORDER — MELATONIN
1000 DAILY
Status: DISCONTINUED | OUTPATIENT
Start: 2018-01-27 | End: 2018-01-29 | Stop reason: HOSPADM

## 2018-01-26 RX ORDER — FUROSEMIDE 40 MG/1
1 TABLET ORAL DAILY
COMMUNITY
Start: 2017-12-28

## 2018-01-26 RX ORDER — ATORVASTATIN CALCIUM 40 MG/1
1 TABLET, FILM COATED ORAL DAILY
COMMUNITY
Start: 2015-06-26

## 2018-01-26 RX ORDER — ATORVASTATIN CALCIUM 40 MG/1
40 TABLET, FILM COATED ORAL
Status: DISCONTINUED | OUTPATIENT
Start: 2018-01-26 | End: 2018-01-29 | Stop reason: HOSPADM

## 2018-01-26 RX ORDER — MORPHINE SULFATE 2 MG/ML
2 INJECTION, SOLUTION INTRAMUSCULAR; INTRAVENOUS ONCE
Status: COMPLETED | OUTPATIENT
Start: 2018-01-26 | End: 2018-01-26

## 2018-01-26 RX ORDER — VALSARTAN 80 MG/1
1 TABLET ORAL DAILY
COMMUNITY
Start: 2015-05-14 | End: 2018-07-03

## 2018-01-26 RX ORDER — ALBUTEROL SULFATE 90 UG/1
2 AEROSOL, METERED RESPIRATORY (INHALATION) EVERY 6 HOURS PRN
Status: DISCONTINUED | OUTPATIENT
Start: 2018-01-26 | End: 2018-01-29 | Stop reason: HOSPADM

## 2018-01-26 RX ORDER — HEPARIN SODIUM 5000 [USP'U]/ML
5000 INJECTION, SOLUTION INTRAVENOUS; SUBCUTANEOUS EVERY 8 HOURS SCHEDULED
Status: DISCONTINUED | OUTPATIENT
Start: 2018-01-26 | End: 2018-01-29 | Stop reason: HOSPADM

## 2018-01-26 RX ORDER — IPRATROPIUM BROMIDE AND ALBUTEROL SULFATE 2.5; .5 MG/3ML; MG/3ML
1 SOLUTION RESPIRATORY (INHALATION) 4 TIMES DAILY
COMMUNITY
End: 2018-07-03

## 2018-01-26 RX ADMIN — CEFTRIAXONE 1000 MG: 1 INJECTION, SOLUTION INTRAVENOUS at 16:34

## 2018-01-26 RX ADMIN — SODIUM CHLORIDE 75 ML/HR: 0.9 INJECTION, SOLUTION INTRAVENOUS at 17:55

## 2018-01-26 RX ADMIN — ACETAMINOPHEN 975 MG: 325 TABLET ORAL at 12:39

## 2018-01-26 RX ADMIN — MORPHINE SULFATE 2 MG: 2 INJECTION, SOLUTION INTRAMUSCULAR; INTRAVENOUS at 14:25

## 2018-01-26 RX ADMIN — AZITHROMYCIN MONOHYDRATE 500 MG: 500 INJECTION, POWDER, LYOPHILIZED, FOR SOLUTION INTRAVENOUS at 17:53

## 2018-01-26 RX ADMIN — HEPARIN SODIUM 5000 UNITS: 5000 INJECTION, SOLUTION INTRAVENOUS; SUBCUTANEOUS at 17:55

## 2018-01-26 RX ADMIN — ATORVASTATIN CALCIUM 40 MG: 40 TABLET, FILM COATED ORAL at 18:02

## 2018-01-26 RX ADMIN — SODIUM CHLORIDE 1000 ML: 0.9 INJECTION, SOLUTION INTRAVENOUS at 12:55

## 2018-01-26 NOTE — ASSESSMENT & PLAN NOTE
Acute renal failure chronic kidney disease stage 3  Will put the patient on gentle hydration and continue to monitor    I will hold the patient's diuretic as well as Diovan

## 2018-01-26 NOTE — H&P
H&P- Renetta Reeves 2/24/1932, 80 y o  male MRN: [id number]    [id number]    Primary Care Provider: Mago Eldridge MD   Date and time admitted to hospital: 1/26/2018 11:40 AM        CAD (coronary artery disease)   Assessment & Plan    Continue patient on his home medications        Dementia   Assessment & Plan    This appears to be advanced  Continue to monitor  There is no behavioral disturbance noted  Acute renal failure (HCC)   Assessment & Plan    Acute renal failure chronic kidney disease stage 3  Will put the patient on gentle hydration and continue to monitor        COPD without exacerbation ([facility] [address] )   Assessment & Plan    No acute exacerbation  Will put the patient on respiratory protocol        Hip pain   Assessment & Plan    Currently the patient did not have hip pain but he was unable to ambulate this morning but he did just receive pain medications  As long as the x-rays were fine we will ambulate the patient physical therapy  If he develops any hip pain or is unable to ambulate or bear weight we will consider further imaging and orthopedic evaluation  Continue with pain control for now  * Community acquired pneumonia   Assessment & Plan    Put the patient on ceftriaxone azithromycin  Follow up with the official x-ray report  Follow WBC count  Check a sputum if able  VTE Prophylaxis: Heparin  / sequential compression device   Code Status:  DNR level 3  POLST: POLST form is on file already (pre-hospital)  Discussion with family:  Discuss with family at the bedside    Anticipated Length of Stay:  Patient will be admitted on an Inpatient basis with an anticipated length of stay of  greater than 2 midnights     Justification for Hospital Stay:  Patient require greater than 2 midnights secondary to having community-acquired pneumonia with a significant leukocytosis as well as pelvic pain with difficulty with ambulation    Total Time for Visit, including Counseling / Coordination of Care:45 minutes     Greater than 50% of this total time spent on direct patient counseling and coordination of care  Chief Complaint:   Could not walk    History of Present Illness:    Korey Stone is a 80 y o  male who presents with difficulty walking  History is obtained from the patient's family at the bedside secondary to the patient having advanced dementia  According to the family the patient fell about 4-5 days ago  He fell down 4-5 carpeted steps  At that time he was doing fine and he was in his usual state of health however this morning when the wife tried to get him up he was unable to bear any weight  He complained of right hip pain earlier today and then when he was here he complained of left hip pain  He had difficulty with any kind of movement or flexion of the hip as well  The patient does have a cough and shortness of breath but family states that is his baseline  When asked about any urinary complaints the patient has been having increased frequency of urination lately  When asked about any fevers or chills the family states that he is always cold but no documented fevers were noted       Review of Systems:    Review of Systems   Constitutional: Positive for activity change, chills and fatigue  Respiratory: Positive for cough and shortness of breath  Genitourinary: Positive for frequency  Musculoskeletal:        Hip pain   Neurological: Positive for weakness  All other systems reviewed and are negative        Past Medical and Surgical History:     Past Medical History:   Diagnosis Date    CAD (coronary artery disease)     Cancer ([facility] )     CKD (chronic kidney disease) stage 3, GFR 30-59 ml/min     Emphysema lung (HCC)     HLD (hyperlipidemia)     HTN (hypertension)     Prostate cancer ([facility] )        Past Surgical History:   Procedure Laterality Date    CARDIAC SURGERY      EYE SURGERY      GASTRECTOMY         Meds/Allergies:    Prior to Admission medications    Medication Sig Start Date End Date Taking? Authorizing Provider   atorvastatin (LIPITOR) 40 mg tablet Take 1 tablet by mouth daily 6/26/15  Yes Historical Provider, MD   furosemide (LASIX) 40 mg tablet Take 1 tablet by mouth daily 12/28/17  Yes Historical Provider, MD   metoprolol succinate (TOPROL-XL) 25 mg 24 hr tablet Take 1 tablet by mouth daily 2/28/14  Yes Historical Provider, MD   valsartan (DIOVAN) 80 mg tablet Take 1 tablet by mouth daily 5/14/15  Yes Historical Provider, MD   albuterol (PROAIR HFA) 90 mcg/act inhaler Inhale 2 puffs every 6 (six) hours as needed    Historical Provider, MD   cholecalciferol (VITAMIN D3) 1,000 units tablet Take by mouth    Historical Provider, MD   Docusate Sodium 100 MG capsule Take 1 tablet by mouth daily    Historical Provider, MD   ipratropium-albuterol (DUO-NEB) 0 5-2 5 mg/3 mL Inhale 1 each 4 (four) times a day    Historical Provider, MD   Multiple Vitamins-Minerals (CENTRUM SILVER 50+MEN PO) Take 1 tablet by mouth daily    Historical Provider, MD   potassium chloride (K-DUR,KLOR-CON) 20 mEq tablet Take 1 tablet by mouth daily    Historical Provider, MD     I have reviewed home medications using allscripts  Allergies:    Allergies   Allergen Reactions    Nitroglycerin Dizziness and Nausea Only    Rivastigmine Dizziness and Nausea Only    Memantine Rash       Social History:     Marital Status: /Civil Union   Occupation:  Retired  Patient Pre-hospital Living Situation:  Lives with his wife  Patient Pre-hospital Level of Mobility:  Independent  Patient Pre-hospital Diet Restrictions:  None  Substance Use History:   History   Alcohol Use No     History   Smoking Status    Never Smoker   Smokeless Tobacco    Never Used     History   Drug Use No       Family History:    Family History   Problem Relation Age of Onset    No Known Problems Mother     No Known Problems Father        Physical Exam:     Vitals:   Blood Pressure: 114/53 (01/26/18 1422)  Pulse: 84 (01/26/18 1422)  Temperature: 98 5 °F (36 9 °C) (01/26/18 1156)  Respirations: 16 (01/26/18 1422)  Height: 6' 2" (188 cm) (01/26/18 1156)  Weight - Scale: 72 6 kg (160 lb) (01/26/18 1156)  SpO2: 95 % (01/26/18 1422)    Physical Exam    (   General Appearance:    Alert, not oriented to time or place  He is oriented to person   Head:    Normocephalic, without obvious abnormality, atraumatic   Eyes:    PERRL, conjunctiva/corneas clear, EOM's intact,             Nose:   Nares normal, septum midline, mucosa normal   Throat:   Lips, mucosa, and tongue normal; teeth and gums normal   Neck:   Supple, symmetrical, no adenopathy;        thyroid:  No enlargement/tenderness/nodules; no carotid    bruit or JVD   Back:     Symmetric, no curvature, ROM normal, no CVA tenderness   Lungs:     Clear to auscultation bilaterally, respirations unlabored       Heart:    Regular rate and rhythm, S1 and S2 normal, no murmur, rub    or gallop   Abdomen:     Soft, non-tender, bowel sounds active all four quadrants,     no masses, no organomegaly           Extremities:   Extremities normal, atraumatic, no cyanosis or edema   Pulses:   2+ and symmetric all extremities   Skin:   Skin color, texture, turgor normal, no rashes or lesions   Lymph nodes:   Cervical, supraclavicular, and axillary nodes normal   Neurologic:   CNII-XII intact  Normal strength, sensation and reflexes       throughout       Additional Data:     Lab Results: I have personally reviewed pertinent reports          Results from last 7 days  Lab Units 01/26/18  1254   WBC Thousand/uL 19 69*   HEMOGLOBIN g/dL 10 4*   HEMATOCRIT % 31 6*   PLATELETS Thousands/uL 320   LYMPHO PCT % 11*   MONO PCT MAN % 5   EOSINO PCT MANUAL % 0       Results from last 7 days  Lab Units 01/26/18  1254   SODIUM mmol/L 139   POTASSIUM mmol/L 4 2   CHLORIDE mmol/L 102   CO2 mmol/L 24   BUN mg/dL 38*   CREATININE mg/dL 1 66*   CALCIUM mg/dL 9 5   TOTAL PROTEIN g/dL 9 3* BILIRUBIN TOTAL mg/dL 0 40   ALK PHOS U/L 100   ALT U/L 37   AST U/L 48*   GLUCOSE RANDOM mg/dL 143*           Imaging: I have personally reviewed pertinent reports  XR chest 1 view portable   ED Interpretation by Padilla Glaser MD (01/26 1451)   Right upper lobe pneumonia  No free air  XR hip/pelv 2-3 vws right   ED Interpretation by Padilla Glaser MD (01/26 1450)   No fracture or dislocation  EKG, Pathology, and Other Studies Reviewed on Admission:   · Chest x-ray personally reviewed  Await official read  There is what seems to be a right upper lobe infiltrate    Allscripts / Epic Records Reviewed: Yes     ** Please Note: This note has been constructed using a voice recognition system   **

## 2018-01-26 NOTE — ASSESSMENT & PLAN NOTE
Acute renal failure chronic kidney disease stage 3    Will put the patient on gentle hydration and continue to monitor

## 2018-01-26 NOTE — ASSESSMENT & PLAN NOTE
Currently the patient did not have hip pain but he was unable to ambulate this morning but he did just receive pain medications  As long as the x-rays were fine we will ambulate the patient physical therapy  If he develops any hip pain or is unable to ambulate or bear weight we will consider further imaging and orthopedic evaluation  Continue with pain control for now

## 2018-01-26 NOTE — ASSESSMENT & PLAN NOTE
Put the patient on ceftriaxone azithromycin  Follow up with the official x-ray report  Follow WBC count  Check a sputum if able

## 2018-01-26 NOTE — ED PROVIDER NOTES
History  Chief Complaint   Patient presents with    Fall     pt fell 3 days ago c/o groin pain and inability to stand or walk today     Patient is an 80-year-old male  He has dementia  About 3 days ago he slipped on some stairs and fell onto his buttocks  He was able to get himself follow-up in actually walk up the stairs  He has done well over the last 3 days except today he started complaining of more pain to the right hip and was unable to stand or weight bear  He denied other injuries  Did not strike his head  No neck pain  No focal motor or sensory complaints  History is limited by dementia  Currently patient is without complaints  Prior to Admission Medications   Prescriptions Last Dose Informant Patient Reported? Taking? Docusate Sodium 100 MG capsule   Yes No   Sig: Take 1 tablet by mouth daily   Multiple Vitamins-Minerals (CENTRUM SILVER 50+MEN PO)   Yes No   Sig: Take 1 tablet by mouth daily   albuterol (PROAIR HFA) 90 mcg/act inhaler   Yes No   Sig: Inhale 2 puffs every 6 (six) hours as needed   atorvastatin (LIPITOR) 40 mg tablet   Yes Yes   Sig: Take 1 tablet by mouth daily   cholecalciferol (VITAMIN D3) 1,000 units tablet   Yes No   Sig: Take by mouth   furosemide (LASIX) 40 mg tablet   Yes Yes   Sig: Take 1 tablet by mouth daily   ipratropium-albuterol (DUO-NEB) 0 5-2 5 mg/3 mL   Yes No   Sig: Inhale 1 each 4 (four) times a day   metoprolol succinate (TOPROL-XL) 25 mg 24 hr tablet   Yes Yes   Sig: Take 1 tablet by mouth daily   potassium chloride (K-DUR,KLOR-CON) 20 mEq tablet   Yes No   Sig: Take 1 tablet by mouth daily   valsartan (DIOVAN) 80 mg tablet   Yes Yes   Sig: Take 1 tablet by mouth daily      Facility-Administered Medications: None       Past Medical History:   Diagnosis Date    [facility] ([facility] [address] )     [facility]        Past Surgical History:   Procedure Laterality Date    CARDIAC SURGERY      EYE SURGERY         History reviewed   No pertinent family history  I have reviewed and agree with the history as documented  Social History   Substance Use Topics    Smoking status: Never Smoker    Smokeless tobacco: Never Used    Alcohol use No        Review of Systems   Unable to perform ROS: Dementia       Physical Exam  ED Triage Vitals   Temperature Pulse Respirations Blood Pressure SpO2   01/26/18 1156 01/26/18 1156 01/26/18 1156 01/26/18 1156 01/26/18 1156   98 5 °F (36 9 °C) 98 18 (!) 90/47 95 %      Temp src Heart Rate Source Patient Position - Orthostatic VS BP Location FiO2 (%)   -- 01/26/18 1422 01/26/18 1422 01/26/18 1422 --    Monitor Lying Right arm       Pain Score       01/26/18 1156       No Pain           Orthostatic Vital Signs  Vitals:    01/26/18 1156 01/26/18 1422   BP: (!) 90/47 114/53   Pulse: 98 84   Patient Position - Orthostatic VS:  Lying       Physical Exam   Constitutional: He appears well-developed and well-nourished  No distress  HENT:   Head: Normocephalic and atraumatic  There is no palpable scalp step off or swelling  No lacerations  There is no facial bone tenderness  No swelling or step-offs  No crepitus  There is no LeFort fracture  No otorrhea  No rhinorrhea  No nasal deformity or epistaxis  There is no raccoon's or Yates's sign  Eyes: EOM are normal  Pupils are equal, round, and reactive to light  Globes are intact  No hyphema  Orbits are atraumatic  Neck:   There is no midline C-spine tenderness  Trachea is midline  There is no step-offs or swelling  No crepitus  No JVD  Cardiovascular: Normal rate, regular rhythm and intact distal pulses  Exam reveals no gallop and no friction rub  Murmur heard  Pulmonary/Chest: Effort normal and breath sounds normal  No stridor  No respiratory distress  He exhibits no tenderness  There is no bruising  No crepitus  Abdominal: Soft  Bowel sounds are normal  He exhibits no distension  There is no tenderness  There is no rebound and no guarding     Musculoskeletal: Normal range of motion  He exhibits no edema, tenderness or deformity  No thoracic or lumbar spine tenderness in the midline  No palpable hip tenderness  Patient is actually moving his right lower extremity spontaneously  He is able to adequately flex it without pain  He has good distal pulses  Neurological: He is alert  He has normal strength  No sensory deficit  GCS eye subscore is 4  GCS verbal subscore is 5  GCS motor subscore is 6  Skin: Skin is warm and dry  He is not diaphoretic  No pallor  Psychiatric: He has a normal mood and affect  Vitals reviewed  ED Medications  Medications   cefTRIAXone (ROCEPHIN) IVPB (premix) 1,000 mg (not administered)   azithromycin (ZITHROMAX) 500 mg in sodium chloride 0 9% 250mL IVPB 500 mg (not administered)   sodium chloride 0 9 % bolus 1,000 mL (0 mL Intravenous Stopped 1/26/18 1421)   acetaminophen (TYLENOL) tablet 975 mg (975 mg Oral Given 1/26/18 1239)   morphine injection 2 mg (2 mg Intravenous Given 1/26/18 1425)       Diagnostic Studies  Results Reviewed     Procedure Component Value Units Date/Time    Blood culture #1 [[id number]]     Lab Status:  No result Specimen:  Blood     Blood culture #2 [[id number]]     Lab Status:  No result Specimen:  Blood     Lactic acid, plasma [[id number]]     Lab Status:  No result Specimen:  Blood     TSH [[id number]]  (Normal) Collected:  01/26/18 1254    Lab Status:  Final result Specimen:  Blood from Arm, Right Updated:  01/26/18 1338     TSH 3RD GENERATON 3 382 uIU/mL     Narrative:         Patients undergoing fluorescein dye angiography may retain small amounts of fluorescein in the body for 48-72 hours post procedure  Samples containing fluorescein can produce falsely depressed TSH values  If the patient had this procedure,a specimen should be resubmitted post fluorescein clearance      Troponin I [[id number]]  (Normal) Collected:  01/26/18 1254    Lab Status:  Final result Specimen:  Blood from Arm, Right Updated:  01/26/18 2885 Troponin I 0 02 ng/mL     Narrative:         [employer] Chemistry analyzer 99% cutoff is > 0 04 ng/mL in network labs    o cTnI 99% cutoff is useful only when applied to patients in the clinical setting of myocardial ischemia  o cTnI 99% cutoff should be interpreted in the context of clinical history, ECG findings and possibly cardiac imaging to establish correct diagnosis  o cTnI 99% cutoff may be suggestive but clearly not indicative of a coronary event without the clinical setting of myocardial ischemia  Comprehensive metabolic panel [[id number]]  (Abnormal) Collected:  01/26/18 1254    Lab Status:  Final result Specimen:  Blood from Arm, Right Updated:  01/26/18 1331     Sodium 139 mmol/L      Potassium 4 2 mmol/L      Chloride 102 mmol/L      CO2 24 mmol/L      Anion Gap 13 mmol/L      BUN 38 (H) mg/dL      Creatinine 1 66 (H) mg/dL      Glucose 143 (H) mg/dL      Calcium 9 5 mg/dL      AST 48 (H) U/L      ALT 37 U/L      Alkaline Phosphatase 100 U/L      Total Protein 9 3 (H) g/dL      Albumin 3 3 (L) g/dL      Total Bilirubin 0 40 mg/dL      eGFR 37 ml/min/1 73sq m     Narrative:         National Kidney Disease Education Program recommendations are as follows:  GFR calculation is accurate only with a steady state creatinine  Chronic Kidney disease less than 60 ml/min/1 73 sq  meters  Kidney failure less than 15 ml/min/1 73 sq  meters      CBC and differential [[id number]]  (Abnormal) Collected:  01/26/18 1254    Lab Status:  Final result Specimen:  Blood from Arm, Right Updated:  01/26/18 1328     WBC 19 69 (H) Thousand/uL      RBC 3 30 (L) Million/uL      Hemoglobin 10 4 (L) g/dL      Hematocrit 31 6 (L) %      MCV 96 fL      MCH 31 5 pg      MCHC 32 9 g/dL      RDW 13 3 %      MPV 9 6 fL      Platelets 706 Thousands/uL      nRBC 0 /100 WBCs     UA w Reflex to Microscopic [[id number]]     Lab Status:  No result Specimen:  Urine                  XR chest 1 view portable   ED Interpretation by Pradeep Garza MD (01/26 1023)   Right upper lobe pneumonia  No free air  XR hip/pelv 2-3 vws right   ED Interpretation by Brock Quiñonez MD (01/26 9121)   No fracture or dislocation  Procedures  Procedures       Phone Contacts  ED Phone Contact    ED Course  ED Course                                MDM  Number of Diagnoses or Management Options  Diagnosis management comments: There does not appear to be any fracture to the hip or pelvis  Patient's blood pressure was on the low side when he arrived  This did respond readily to IV fluids  Is BUN and creatinine were elevated  I suspect patient might have been dry  Patient does have an elevated white blood cell count  It is 19,000  He is afebrile  Family reports that he has been coughing  His chest x-ray shows a right upper lobe pneumonia  Urinalysis is still pending  Patient was cultured  Empiric antibiotics for community-acquired pneumonia were ordered  Consulted with hospitalist for admission  Amount and/or Complexity of Data Reviewed  Clinical lab tests: ordered and reviewed  Tests in the radiology section of CPT®: reviewed and ordered  Discuss the patient with other providers: yes  Independent visualization of images, tracings, or specimens: yes      CritCare Time    Disposition  Final diagnoses:   Right upper lobe pneumonia ([address] )   Right hip pain     Time reflects when diagnosis was documented in both MDM as applicable and the Disposition within this note     Time User Action Codes Description Comment    1/26/2018  2:52 PM Debbi Turner Add [J18 1] Right upper lobe pneumonia ([address] )     1/26/2018  2:53 PM Debbi Mirza [M25 551] Right hip pain       ED Disposition     ED Disposition Condition Comment    Admit        Follow-up Information    None       Patient's Medications   Discharge Prescriptions    No medications on file     No discharge procedures on file      ED Provider  Electronically Signed by           Brock Quiñonez MD  01/26/18 1457
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
## 2018-01-27 PROBLEM — I50.22 CHRONIC SYSTOLIC CHF (CONGESTIVE HEART FAILURE) (HCC): Status: ACTIVE | Noted: 2018-01-27

## 2018-01-27 PROBLEM — K59.00 CONSTIPATION: Status: ACTIVE | Noted: 2018-01-27

## 2018-01-27 PROBLEM — I35.0 MODERATE AORTIC STENOSIS: Status: ACTIVE | Noted: 2018-01-27

## 2018-01-27 PROBLEM — D64.9 ANEMIA: Status: ACTIVE | Noted: 2018-01-27

## 2018-01-27 LAB
ANION GAP SERPL CALCULATED.3IONS-SCNC: 9 MMOL/L (ref 4–13)
BUN SERPL-MCNC: 32 MG/DL (ref 5–25)
CALCIUM SERPL-MCNC: 9 MG/DL (ref 8.3–10.1)
CHLORIDE SERPL-SCNC: 106 MMOL/L (ref 100–108)
CO2 SERPL-SCNC: 24 MMOL/L (ref 21–32)
CREAT SERPL-MCNC: 1.55 MG/DL (ref 0.6–1.3)
ERYTHROCYTE [DISTWIDTH] IN BLOOD BY AUTOMATED COUNT: 13.2 % (ref 11.6–15.1)
GFR SERPL CREATININE-BSD FRML MDRD: 40 ML/MIN/1.73SQ M
GLUCOSE SERPL-MCNC: 134 MG/DL (ref 65–140)
HCT VFR BLD AUTO: 29 % (ref 36.5–49.3)
HGB BLD-MCNC: 9.6 G/DL (ref 12–17)
MAGNESIUM SERPL-MCNC: 2.2 MG/DL (ref 1.6–2.6)
MCH RBC QN AUTO: 31.6 PG (ref 26.8–34.3)
MCHC RBC AUTO-ENTMCNC: 33.1 G/DL (ref 31.4–37.4)
MCV RBC AUTO: 95 FL (ref 82–98)
PLATELET # BLD AUTO: 308 THOUSANDS/UL (ref 149–390)
PMV BLD AUTO: 9.4 FL (ref 8.9–12.7)
POTASSIUM SERPL-SCNC: 4.8 MMOL/L (ref 3.5–5.3)
RBC # BLD AUTO: 3.04 MILLION/UL (ref 3.88–5.62)
SODIUM SERPL-SCNC: 139 MMOL/L (ref 136–145)
WBC # BLD AUTO: 14.07 THOUSAND/UL (ref 4.31–10.16)

## 2018-01-27 PROCEDURE — 94664 DEMO&/EVAL PT USE INHALER: CPT

## 2018-01-27 PROCEDURE — 36415 COLL VENOUS BLD VENIPUNCTURE: CPT | Performed by: FAMILY MEDICINE

## 2018-01-27 PROCEDURE — 99285 EMERGENCY DEPT VISIT HI MDM: CPT

## 2018-01-27 PROCEDURE — 99233 SBSQ HOSP IP/OBS HIGH 50: CPT | Performed by: INTERNAL MEDICINE

## 2018-01-27 PROCEDURE — 83735 ASSAY OF MAGNESIUM: CPT | Performed by: FAMILY MEDICINE

## 2018-01-27 PROCEDURE — 85027 COMPLETE CBC AUTOMATED: CPT | Performed by: FAMILY MEDICINE

## 2018-01-27 PROCEDURE — 80048 BASIC METABOLIC PNL TOTAL CA: CPT | Performed by: FAMILY MEDICINE

## 2018-01-27 PROCEDURE — 97163 PT EVAL HIGH COMPLEX 45 MIN: CPT

## 2018-01-27 PROCEDURE — G8979 MOBILITY GOAL STATUS: HCPCS

## 2018-01-27 PROCEDURE — G8978 MOBILITY CURRENT STATUS: HCPCS

## 2018-01-27 RX ORDER — POLYETHYLENE GLYCOL 3350 17 G/17G
17 POWDER, FOR SOLUTION ORAL DAILY
Status: DISCONTINUED | OUTPATIENT
Start: 2018-01-27 | End: 2018-01-29 | Stop reason: HOSPADM

## 2018-01-27 RX ADMIN — HEPARIN SODIUM 5000 UNITS: 5000 INJECTION, SOLUTION INTRAVENOUS; SUBCUTANEOUS at 21:19

## 2018-01-27 RX ADMIN — Medication 1 TABLET: at 08:52

## 2018-01-27 RX ADMIN — CHOLECALCIFEROL TAB 25 MCG (1000 UNIT) 1000 UNITS: 25 TAB at 08:52

## 2018-01-27 RX ADMIN — ACETAMINOPHEN 650 MG: 325 TABLET ORAL at 21:19

## 2018-01-27 RX ADMIN — ATORVASTATIN CALCIUM 40 MG: 40 TABLET, FILM COATED ORAL at 16:30

## 2018-01-27 RX ADMIN — CEFTRIAXONE 1000 MG: 1 INJECTION, SOLUTION INTRAVENOUS at 16:30

## 2018-01-27 RX ADMIN — POLYETHYLENE GLYCOL 3350 17 G: 17 POWDER, FOR SOLUTION ORAL at 11:00

## 2018-01-27 RX ADMIN — HEPARIN SODIUM 5000 UNITS: 5000 INJECTION, SOLUTION INTRAVENOUS; SUBCUTANEOUS at 05:40

## 2018-01-27 RX ADMIN — HEPARIN SODIUM 5000 UNITS: 5000 INJECTION, SOLUTION INTRAVENOUS; SUBCUTANEOUS at 16:30

## 2018-01-27 RX ADMIN — AZITHROMYCIN 250 MG: 250 TABLET, FILM COATED ORAL at 08:52

## 2018-01-27 NOTE — PLAN OF CARE
Problem: PHYSICAL THERAPY ADULT  Goal: Performs mobility at highest level of function for planned discharge setting  See evaluation for individualized goals  Treatment/Interventions: Functional transfer training, LE strengthening/ROM, Elevations, Therapeutic exercise, Endurance training, Patient/family training, Bed mobility, Gait training, Equipment eval/education, Spoke to nursing, Family          See flowsheet documentation for full assessment, interventions and recommendations  Prognosis: Fair  Problem List: Decreased strength, Decreased endurance, Impaired balance, Decreased mobility, Decreased cognition, Impaired judgement, Decreased safety awareness, Impaired hearing  Assessment: pt is an 84y/o m who presents to [facility] c R hip pain s/p fall 4-5 days ago  family reports ambulatory dysfunction + advanced dementia  imaging (-) for acute fx, however noted age indeterminate T12/L2 compression fxs  PMH significant for COPD, dementia, renal failure, CAD, CHF, + anemia  pt unreliable historian oriented only to person during PT eval  pt's dtr at bedside to provide hx  at baseline, pt ambulates c RW mod (I), however dtr reports pt non-compliant c RW  pt resides c spouse in bi-level home c 8+6 steps btwn levels of home  dtr reports spouse occassionally needs to (A) pt c ADLs 2* advanced dementia  pt currently requires mod-max (A)x2 for functional mobility tasks 2* deficits in strength, balance, gait quality, cognition, + activity tolerance noted in PT exam above  Barthel Index 20/100  max verbal cues required for initiation of tasks c pt inconsistent c following commands  initiated ambulation c RW 1' fwd + 1' bkwd along c marches in place c max cues for technique  pt also incontinent of stool during session  returned to supine for pericare at end of session c dtr at bedside  would benefit from skilled PT to maximize functional mobility + improve quality of life  upon d/c, recommend STR   PT eval of high complexity 2* unstable med status c pt requiring ongoing medical management s/p fall + PNA  pt c multiple co-morbidities including advanced dementia impacting PT  presents c significant decline in mobility from baseline requiring mod-max (A)x2 for functional mobility tasks 2* mobility deficits above  resides c spouse in bi-level home c 8+6 steps btwn levels + has h/o falls  Barriers to Discharge: Inaccessible home environment, Decreased caregiver support (? ability to spouse to cont to care for pt)     Recommendation: Short-term skilled PT     PT - OK to Discharge: Yes (to STR )    See flowsheet documentation for full assessment

## 2018-01-27 NOTE — PHYSICAL THERAPY NOTE
PT Evaluation (30min)  (12:00-12:30)    Past Medical History:   Diagnosis Date    CAD (coronary artery disease)     [facility] ([facility] [address] )     CKD (chronic kidney disease) stage 3, GFR 30-59 ml/min     Emphysema lung (HCC)     HLD (hyperlipidemia)     HTN (hypertension)     Prostate cancer ([facility] [address] )       01/27/18 1230   Note Type   Note type Eval only   Pain Assessment   Pain Assessment FLACC   Pain Rating: FLACC (Rest) - Face 0   Pain Rating: FLACC (Rest) - Legs 0   Pain Rating: FLACC (Rest) - Activity 0   Pain Rating: FLACC (Rest) - Cry 0   Pain Rating: FLACC (Rest) - Consolability 0   Score: FLACC (Rest) 0   Pain Rating: FLACC (Activity) - Face 0   Pain Rating: FLACC (Activity) - Legs 0   Pain Rating: FLACC (Activity) - Activity 0   Pain Rating: FLACC (Activity) - Cry 0   Pain Rating: FLACC (Activity) - Consolability 0   Score: FLACC (Activity) 0   Home Living   Type of Home House   Home Layout Two level;Stairs to enter with rails  (bi-level; 8+6 steps btwn levels c rail)   [address] [place] [address] chair   Home Equipment Walker; Other (Comment)  (home O2 (2L) at night)   Prior Function   Level of [place] Independent with ADLs and functional mobility  (dtr reports pt non-compliant c RW)   Lives With Spouse   Receives Help From Family  (dtr is RN)   ADL Assistance Needs assistance  (dtr reports needs (A) 2* dementia)   IADLs Needs assistance   Falls in the last 6 months 1 to 4   Vocational Retired   Comments pt poor historian  oriented only to person  dtr at bedside to (A) c social hx  reports pt ambulatory at baseline, however non-compliant c RW  Restrictions/Precautions   Other Precautions Cognitive; Chair Alarm; Bed Alarm; Fall Risk;Hard of hearing   General   Additional Pertinent History pt presents to [facility] c ambulatory dysfunction s/p fall 4-5 days PTA  imaging (-) for acute fx despite hip pain  imaging (+) for age indeterminate compression fxs T12/L2  PT consulted for mobility + d/c planning  up c (A)  Family/Caregiver Present Yes  (pt's dtr)   Cognition   Orientation Level Oriented to person   Following Commands Follows one step commands inconsistently   RUE Assessment   RUE Assessment WFL  (4-/5)   LUE Assessment   LUE Assessment WFL  (4-/5)   RLE Assessment   RLE Assessment WFL  (4-/5)   LLE Assessment   LLE Assessment WFL  (4-/5)   Coordination   Sensation WFL   Bed Mobility   Rolling R 2  Maximal assistance   Additional items Assist x 2;Bedrails;Verbal cues; Increased time required   Rolling L 2  Maximal assistance   Additional items Assist x 2;Bedrails; Increased time required;Verbal cues   Supine to Sit 2  Maximal assistance   Additional items Assist x 2;HOB elevated; Increased time required;Verbal cues;LE management   Sit to Supine 2  Maximal assistance   Additional items Assist x 2; Increased time required;Verbal cues;LE management   Transfers   Sit to Stand 3  Moderate assistance   Additional items Assist x 2;Verbal cues; Increased time required   Stand to Sit 3  Moderate assistance   Additional items Assist x 2;Verbal cues; Increased time required   Ambulation/Elevation   Gait pattern Narrow ARMINDA; Forward Flexion;Decreased foot clearance; Short stride   Gait Assistance 3  Moderate assist   Additional items Assist x 2;Verbal cues   Assistive Device Rolling walker   Distance 1' fwd, 1' bkwd; marching in place   Balance   Static Sitting Poor +   Dynamic Sitting Poor   Static Standing Poor   Dynamic Standing Poor   Ambulatory Poor   Activity Tolerance   Activity Tolerance Patient limited by fatigue   Nurse Made Aware [facility]   Assessment   Prognosis Fair   Problem List Decreased strength;Decreased endurance; Impaired balance;Decreased mobility; Decreased cognition; Impaired judgement;Decreased safety awareness; Impaired hearing   Assessment pt is an 86y/o m who presents to [facility] c R hip pain s/p fall 4-5 days ago   family reports ambulatory dysfunction + advanced dementia  imaging (-) for acute fx, however noted age indeterminate T12/L2 compression fxs  PMH significant for COPD, dementia, renal failure, CAD, CHF, + anemia  pt unreliable historian oriented only to person during PT eval  pt's dtr at bedside to provide hx  at baseline, pt ambulates c RW mod (I), however dtr reports pt non-compliant c RW  pt resides c spouse in bi-level home c 8+6 steps btwn levels of home  dtr reports spouse occassionally needs to (A) pt c ADLs 2* advanced dementia  pt currently requires mod-max (A)x2 for functional mobility tasks 2* deficits in strength, balance, gait quality, cognition, + activity tolerance noted in PT exam above  Barthel Index 20/100  max verbal cues required for initiation of tasks c pt inconsistent c following commands  initiated ambulation c RW 1' fwd + 1' bkwd along c marches in place c max cues for technique  pt also incontinent of stool during session  returned to supine for pericare at end of session c dtr at bedside  would benefit from skilled PT to maximize functional mobility + improve quality of life  upon d/c, recommend STR  PT eval of high complexity 2* unstable med status c pt requiring ongoing medical management s/p fall + PNA  pt c multiple co-morbidities including advanced dementia impacting PT  presents c significant decline in mobility from baseline requiring mod-max (A)x2 for functional mobility tasks 2* mobility deficits above  resides c spouse in bi-level home c 8+6 steps btwn levels + has h/o falls  Barriers to Discharge Inaccessible home environment;Decreased caregiver support  (? ability to spouse to cont to care for pt)   Goals   Patient Goals none stated   STG Expiration Date 02/06/18   Short Term Goal #1 1   increase strength 1/2 grade to improve overall functional mobility, 2  perform bed mobility min (A)x1 to sit up + visit c family, 3  safely perform transfers min (A)x1 to perform ADLs, 4  ambulate 150' c RW min (A)x1 to safely navigate home environment, 5  negotiate 8 stairs min (A)x1 to access 2nd floor of home   Plan   Treatment/Interventions Functional transfer training;LE strengthening/ROM; Elevations; Therapeutic exercise; Endurance training;Patient/family training;Bed mobility;Gait training;Equipment eval/education;Spoke to nursing;Family   PT Frequency 5x/wk   Recommendation   Recommendation Short-term skilled PT   PT - OK to Discharge Yes  (to STR )   Barthel Index   Feeding 5   Bathing 0   Grooming Score 0   Dressing Score 5   Bladder Score 0   Bowels Score 0   Toilet Use Score 5   Transfers (Bed/Chair) Score 5   Mobility (Level Surface) Score 0   Stairs Score 0   Barthel Index Score 20     Wendy Valladares, PT

## 2018-01-27 NOTE — RESPIRATORY THERAPY NOTE
RT Protocol Note  Jesus Fuller 80 y o  male MRN: [id number]  Unit/Bed#: ED 09 Encounter: [id number]    Assessment    Principal Problem:    Community acquired pneumonia  Active Problems:    Hip pain    COPD without exacerbation (HCC)    Acute renal failure (HCC)    Dementia    CAD (coronary artery disease)    Moderate aortic stenosis    Chronic systolic CHF (congestive heart failure) (HCC)    Falls    Anemia    Constipation      Home Pulmonary Medications:  Duoneb    Past Medical History:   Diagnosis Date    CAD (coronary artery disease)     [facility]     CKD (chronic kidney disease) stage 3, GFR 30-59 ml/min     Emphysema lung (HCC)     HLD (hyperlipidemia)     HTN (hypertension)     Prostate cancer ([facility] )      Social History     Social History    Marital status: /Civil Union     Spouse name: N/A    Number of children: N/A    Years of education: N/A     Social History Main Topics    Smoking status: Never Smoker    Smokeless tobacco: Never Used    Alcohol use No    Drug use: No    Sexual activity: Not Asked     Other Topics Concern    None     Social History Narrative    None       Subjective         Objective    Physical Exam:   Assessment Type: Assess only  General Appearance: Awake  Respiratory Pattern: Normal  Chest Assessment: Chest expansion symmetrical  Bilateral Breath Sounds: Diminished    Vitals:  Blood pressure 93/50, pulse 83, temperature 98 4 °F (36 9 °C), temperature source Oral, resp  rate 16, height 6' 2" (1 88 m), weight 72 6 kg (160 lb), SpO2 94 %  Imaging and other studies: I have personally reviewed pertinent reports  Plan    Respiratory Plan: No distress/Pulmonary history        Resp Comments: (P) Pt  was assessed as per protocol  Pt  does not appear to be in any respiratory distress  Pt  family satated that he uses Duoneb PRN at home and O2 for sleep  pt  will continue to be on Duoneb PRN  We will continue to monitor for any change in condition

## 2018-01-27 NOTE — RESPIRATORY THERAPY NOTE
Pt has dementia and is refusing to participate with the resp protocol at this time  Will let day shift know to see if they can try again when family is around to answer questions

## 2018-01-27 NOTE — PROGRESS NOTES
Progress Note - Supriya Gambino 80 y o  male MRN: [id number]  Unit/Bed#: ED 09 Encounter: [id number]    Subjective:   Patient is sleeping at the time of my arrival   He is easily arousable with tactile stimuli  He is alert, cooperative and quite pleasant however his history is completely unreliable due to his dementia  His review of systems is 100% negative  He does not know where he is or why he is here  His daughter, Charmayne Honey, is at the bedside and states he slept fairly well overnight  He has been coughing  She is concerned with the CT scan results because he had pain in his groin and she is concern for a fracture  She also notes he has had long history of constipation with intermittent fecal smearing  All other ROS are negative  Objective:   Vitals: Blood pressure 93/50, pulse 83, temperature 98 4 °F (36 9 °C), temperature source Oral, resp  rate 16, height 6' 2" (1 88 m), weight 72 6 kg (160 lb), SpO2 94 %  ,Body mass index is 20 54 kg/m²  SPO2 RA Rest    [facility] ED from 1/26/2018 in [address] [place] [address] Emergency Department   SpO2  94 %   SpO2 Activity  At Rest   O2 Device  None (Room air)   O2 Flow Rate  No data        I&O: No intake or output data in the 24 hours ending 01/27/18 1027      Physical Exam:       General Appearance:    Alert, oriented only to person, cooperative, no distress   Head:    Normocephalic, without obvious abnormality, atraumatic   Eyes:    PERRL, conjunctiva/corneas clear, EOM's intact       Nose:   Moist mucous membranes, no drainage or sinus tenderness   Throat:   No tenderness, no exudates   Neck:   Supple, symmetrical, trachea midline, no JVD   Lungs:     Crackles at left base, respirations unlabored   Heart:    Regular rate and rhythm, S1 and S2 normal, 2/6 holosystolic murmur, no rub   or gallop   Abdomen: Soft, non-tender, positive bowel sounds, no masses, no organomegaly   Extremities:  No pedal edema, calf tenderness   Pelvis is stable, no tenderness at the hip or groin  Distal pulses palpable           Invasive Devices     Peripheral Intravenous Line            Peripheral IV 01/26/18 Right Antecubital less than 1 day                      Social History  reviewed  Family History   Problem Relation Age of Onset    No Known Problems Mother     No Known Problems Father     reviewed    Meds:  Current Facility-Administered Medications   Medication Dose Route Frequency Provider Last Rate Last Dose    acetaminophen (TYLENOL) tablet 650 mg  650 mg Oral Q6H PRN Shorty Post MD        albuterol (PROVENTIL HFA,VENTOLIN HFA) inhaler 2 puff  2 puff Inhalation Q6H PRN Shorty Post MD        atorvastatin (LIPITOR) tablet 40 mg  40 mg Oral Daily With Dipak Gutierres MD   40 mg at 01/26/18 1802    azithromycin (ZITHROMAX) tablet 250 mg  250 mg Oral Q24H Shorty Post MD   250 mg at 01/27/18 4855    cefTRIAXone (ROCEPHIN) IVPB (premix) 1,000 mg  1,000 mg Intravenous Q24H Shorty Post MD        cholecalciferol (VITAMIN D3) tablet 1,000 Units  1,000 Units Oral Daily Shorty Post MD   1,000 Units at 01/27/18 0852    heparin (porcine) subcutaneous injection 5,000 Units  5,000 Units Subcutaneous [facility] Shorty Post MD   5,000 Units at 01/27/18 0540    multivitamin-minerals (CENTRUM) tablet 1 tablet  1 tablet Oral Daily Shorty Post MD   1 tablet at 01/27/18 0852    ondansetron (ZOFRAN) injection 4 mg  4 mg Intravenous Q6H PRN Shorty Post MD        polyethylene glycol (MIRALAX) packet 17 g  17 g Oral Daily Bianca Mari MD         Current Outpatient Prescriptions   Medication Sig Dispense Refill    atorvastatin (LIPITOR) 40 mg tablet Take 1 tablet by mouth daily      furosemide (LASIX) 40 mg tablet Take 1 tablet by mouth daily      metoprolol succinate (TOPROL-XL) 25 mg 24 hr tablet Take 1 tablet by mouth daily      valsartan (DIOVAN) 80 mg tablet Take 1 tablet by mouth daily      albuterol (PROAIR HFA) 90 mcg/act inhaler Inhale 2 puffs every 6 (six) hours as needed  cholecalciferol (VITAMIN D3) 1,000 units tablet Take by mouth      Docusate Sodium 100 MG capsule Take 1 tablet by mouth daily      ipratropium-albuterol (DUO-NEB) 0 5-2 5 mg/3 mL Inhale 1 each 4 (four) times a day      Multiple Vitamins-Minerals (CENTRUM SILVER 50+MEN PO) Take 1 tablet by mouth daily      potassium chloride (K-DUR,KLOR-CON) 20 mEq tablet Take 1 tablet by mouth daily          (Not in a hospital admission)    Labs:    Results from last 7 days  Lab Units 01/27/18  0504 01/26/18  1254   WBC Thousand/uL 14 07* 19 69*   HEMOGLOBIN g/dL 9 6* 10 4*   HEMATOCRIT % 29 0* 31 6*   PLATELETS Thousands/uL 308 320   LYMPHO PCT %  --  11*   MONO PCT MAN %  --  5   EOSINO PCT MANUAL %  --  0       Results from last 7 days  Lab Units 01/27/18  0504 01/26/18  1254   SODIUM mmol/L 139 139   POTASSIUM mmol/L 4 8 4 2   CHLORIDE mmol/L 106 102   CO2 mmol/L 24 24   BUN mg/dL 32* 38*   CREATININE mg/dL 1 55* 1 66*   CALCIUM mg/dL 9 0 9 5   TOTAL PROTEIN g/dL  --  9 3*   BILIRUBIN TOTAL mg/dL  --  0 40   ALK PHOS U/L  --  100   ALT U/L  --  37   AST U/L  --  48*   GLUCOSE RANDOM mg/dL 134 143*     Lab Results   Component Value Date    TROPONINI 0 02 01/26/2018         No results found for: [place], SPUTUMCULTUR    Imaging:  Results for orders placed during the hospital encounter of 01/26/18   XR chest 1 view portable    Narrative CHEST      INDICATION: Trauma  COMPARISON:  6/2/2017  VIEWS:   AP frontal; 1 image    FINDINGS:    Median sternotomy wires are present  The cardiomediastinal silhouette is stable  Right upper lobe opacity may reflect pneumonia  Emphysema is noted  Osseous structures are age appropriate  Impression Right upper lobe opacity potential reflecting pneumonia  Emphysema  Workstation performed: [id number]       No results found for this or any previous visit      VTE Pharmacologic Prophylaxis: Heparin      Code Status:   Level 3 - DNAR and DNI    Assessment:  Principal Problem:    Community acquired pneumonia  Active Problems:    Hip pain    COPD without exacerbation (HCC)    Acute renal failure (HCC)    Dementia    CAD (coronary artery disease)    Moderate aortic stenosis    Chronic systolic CHF (congestive heart failure) ([facility] )    Falls    Anemia    Constipation  Resolved Problems:    * No resolved hospital problems  *      Plan:  · Community-acquired pneumonia in the setting of oxygen-dependent COPD-continue on Rocephin and Zithromax with oxygen to keep saturations greater than 89%  Continue with nebulizers  Hold off on steroids for now as he is not wheezing and this could exacerbate change in mental status  · Gait dysfunction and falls-no evidence of fracture by x-ray or CT  PT consultation  Case management consultation  Patient will benefit from short-term rehab in skilled nursing facility  · Coronary artery disease with chronic systolic CHF and at least moderate aortic stenosis by echocardiogram 2015 where EF was 45%  He does not appear to be in exacerbation of CHF  Metoprolol was discontinued for hypotension  · Acute on chronic kidney disease stage 3 slightly improved with hydration, continue to monitor  · Constipation-soapsuds enema and MiraLax  · Anemia-no evidence for acute blood loss, perhaps related to chronic kidney disease and hydration effect, recheck CBC in a m  check iron studies, J05 and folic acid  · Advanced dementia-patient has had significant decline in last 6 months  We discussed long-term options at the bedside with daughter Pradip Duke  She helps out at least 1 day per week but wife, Theron Rudd is primary caregiver  Agreeable at this time for short-term rehab with potential long-term placement subsequently  Patient does not meet criteria for hospice based upon dementia, COPD or cardiac condition at this time        Murali Wilhelm MD  1/27/2018,10:27 AM

## 2018-01-27 NOTE — CASE MANAGEMENT
Initial Clinical Review    Admission: Date/Time/Statement: 1/26/18 @ 1454    Orders Placed This Encounter   Procedures    Inpatient Admission (expected length of stay for this patient is greater than two midnights)     Standing Status:   Standing     Number of Occurrences:   1     Order Specific Question:   Admitting Physician     Answer:   Briseyda Driscoll     Order Specific Question:   Level of Care     Answer:   Med Surg [16]     Order Specific Question:   Estimated length of stay     Answer:   More than 2 Midnights     Order Specific Question:   Certification     Answer:   I certify that inpatient services are medically necessary for this patient for a duration of greater than two midnights  See H&P and MD Progress Notes for additional information about the patient's course of treatment  ED: Date/Time/Mode of Arrival:   ED Arrival Information     Expected Arrival Acuity Means of Arrival Escorted By Service Admission Type    - 1/26/2018 11:39 Urgent Ambulance [facility]) General Medicine Urgent    Arrival Complaint    FALL          Chief Complaint:   Chief Complaint   Patient presents with    Fall     pt fell 3 days ago c/o groin pain and inability to stand or walk today       History of Illness: 80 y o  male who presents with difficulty walking  History is obtained from the patient's family at the bedside secondary to the patient having advanced dementia  According to the family the patient fell about 4-5 days ago  He fell down 4-5 carpeted steps  At that time he was doing fine and he was in his usual state of health however this morning when the wife tried to get him up he was unable to bear any weight  He complained of right hip pain earlier today and then when he was here he complained of left hip pain  He had difficulty with any kind of movement or flexion of the hip as well  The patient does have a cough and shortness of breath but family states that is his baseline    When asked about any urinary complaints the patient has been having increased frequency of urination lately  When asked about any fevers or chills the family states that he is always cold but no documented fevers were noted       ED Vital Signs:   ED Triage Vitals   Temperature Pulse Respirations Blood Pressure SpO2   01/26/18 1156 01/26/18 1156 01/26/18 1156 01/26/18 1156 01/26/18 1156   98 5 °F (36 9 °C) 98 18 (!) 90/47 95 %      Temp Source Heart Rate Source Patient Position - Orthostatic VS BP Location FiO2 (%)   01/27/18 0937 01/26/18 1422 01/26/18 1422 01/26/18 1422 --   Oral Monitor Lying Right arm       Pain Score       01/26/18 1156       No Pain        Wt Readings from Last 1 Encounters:   01/26/18 72 6 kg (160 lb)       Vital Signs (abnormal): WNL    Abnormal Labs:    01/26/18 1254    WBC 4 31 - 10 16 Thousand/uL 19 69     RBC 3 88 - 5 62 Million/uL 3 30     Hemoglobin 12 0 - 17 0 g/dL 10 4     Hematocrit 36 5 - 49 3 % 31 6       BUN 5 - 25 mg/dL 38     Creatinine 0 60 - 1 30 mg/dL 1 66       Diagnostic Test Results: PCXR - Right upper lobe opacity potential reflecting pneumonia  Emphysema  CT Abd/ Pelvis - No evidence for pelvic fracture  Presumed remote compression fractures of T12 and L2  Correlate for point tenderness  Partially visualized bilateral lower lobe consolidation  Moderate fecal burden within the rectum      ED Treatment:   Medication Administration from 01/26/2018 1139 to 01/27/2018 1342       Date/Time Order Dose Route Action     01/26/2018 1255 sodium chloride 0 9 % bolus 1,000 mL 1,000 mL Intravenous New Bag     01/26/2018 1239 acetaminophen (TYLENOL) tablet 975 mg 975 mg Oral Given     01/26/2018 1425 morphine injection 2 mg 2 mg Intravenous Given     01/26/2018 1634 cefTRIAXone (ROCEPHIN) IVPB (premix) 1,000 mg 1,000 mg Intravenous New Bag     01/26/2018 1753 azithromycin (ZITHROMAX) 500 mg in sodium chloride 0 9% 250mL IVPB 500 mg 500 mg Intravenous New Bag     01/26/2018 1802 atorvastatin (LIPITOR) tablet 40 mg 40 mg Oral Given     01/27/2018 0852 cholecalciferol (VITAMIN D3) tablet 1,000 Units 1,000 Units Oral Given     01/27/2018 0852 multivitamin-minerals (CENTRUM) tablet 1 tablet 1 tablet Oral Given     01/27/2018 0852 azithromycin (ZITHROMAX) tablet 250 mg 250 mg Oral Given     01/26/2018 1755 sodium chloride 0 9 % infusion 75 mL/hr Intravenous New Bag     01/27/2018 0540 heparin (porcine) subcutaneous injection 5,000 Units 5,000 Units Subcutaneous Given     01/26/2018 1755 heparin (porcine) subcutaneous injection 5,000 Units 5,000 Units Subcutaneous Given     01/27/2018 1100 polyethylene glycol (MIRALAX) packet 17 g 17 g Oral Given          Past Medical/Surgical History: Active Ambulatory Problems     Diagnosis Date Noted    No Active Ambulatory Problems     Resolved Ambulatory Problems     Diagnosis Date Noted    No Resolved Ambulatory Problems     Past Medical History:   Diagnosis Date    CAD (coronary artery disease)     Cancer ([facility] [address] )     CKD (chronic kidney disease) stage 3, GFR 30-59 ml/min     Emphysema lung (HCC)     HLD (hyperlipidemia)     HTN (hypertension)     Prostate cancer (HCC)        Admitting Diagnosis: Groin pain [R10 30]    Age/Sex: 80 y o  male    Assessment/Plan:  CAD (coronary artery disease)   Assessment & Plan     Continue patient on his home medications          Dementia   Assessment & Plan     This appears to be advanced  Continue to monitor  There is no behavioral disturbance noted            Acute renal failure (HCC)   Assessment & Plan     Acute renal failure chronic kidney disease stage 3  Will put the patient on gentle hydration and continue to monitor          COP[facility]ation [facility] )   Assessment & Plan     No acute exacerbation  Will put the patient on respiratory protocol          Hip pain   Assessment & Plan     Currently the patient did not have hip pain but he was unable to ambulate this morning but he did just receive pain medications  As long as the x-rays were fine we will ambulate the patient physical therapy  If he develops any hip pain or is unable to ambulate or bear weight we will consider further imaging and orthopedic evaluation  Continue with pain control for now           * Community acquired pneumonia   Assessment & Plan     Put the patient on ceftriaxone azithromycin  Follow up with the official x-ray report  Follow WBC count  Check a sputum if able              VTE Prophylaxis: Heparin  / sequential compression device   Code Status:  DNR level 3  POLST: POLST form is on file already (pre-hospital)  Discussion with family:  Discuss with family at the bedside     Anticipated Length of Stay:  Patient will be admitted on an Inpatient basis with an anticipated length of stay of  greater than 2 midnights     Justification for Hospital Stay:  Patient require greater than 2 midnights secondary to having community-acquired pneumonia with a significant leukocytosis as well as pelvic pain with difficulty with ambulation       Admission Orders:  Bld culture x2  Sputum culture and Gram stain  PT eval and treat    Scheduled Meds:   atorvastatin 40 mg Oral Daily With Dinner   azithromycin 250 mg Oral Q24H   cefTRIAXone 1,000 mg Intravenous Q24H   cholecalciferol 1,000 Units Oral Daily   heparin (porcine) 5,000 Units Subcutaneous Q8H [address]   multivitamin-minerals 1 tablet Oral Daily   polyethylene glycol 17 g Oral Daily     Continuous Infusions:    PRN Meds:   acetaminophen    albuterol    ondansetron

## 2018-01-27 NOTE — ED NOTES
Pt brief/linens changed   Pt was also placed on 2L NC per family request      Jordy Lennon RN  01/26/18 1934

## 2018-01-28 PROCEDURE — 99232 SBSQ HOSP IP/OBS MODERATE 35: CPT | Performed by: FAMILY MEDICINE

## 2018-01-28 RX ADMIN — AZITHROMYCIN 250 MG: 250 TABLET, FILM COATED ORAL at 09:01

## 2018-01-28 RX ADMIN — CEFTRIAXONE 1000 MG: 1 INJECTION, SOLUTION INTRAVENOUS at 17:18

## 2018-01-28 RX ADMIN — HEPARIN SODIUM 5000 UNITS: 5000 INJECTION, SOLUTION INTRAVENOUS; SUBCUTANEOUS at 22:56

## 2018-01-28 RX ADMIN — ATORVASTATIN CALCIUM 40 MG: 40 TABLET, FILM COATED ORAL at 17:19

## 2018-01-28 RX ADMIN — CHOLECALCIFEROL TAB 25 MCG (1000 UNIT) 1000 UNITS: 25 TAB at 09:01

## 2018-01-28 RX ADMIN — POLYETHYLENE GLYCOL 3350 17 G: 17 POWDER, FOR SOLUTION ORAL at 09:02

## 2018-01-28 RX ADMIN — HEPARIN SODIUM 5000 UNITS: 5000 INJECTION, SOLUTION INTRAVENOUS; SUBCUTANEOUS at 17:19

## 2018-01-28 RX ADMIN — HEPARIN SODIUM 5000 UNITS: 5000 INJECTION, SOLUTION INTRAVENOUS; SUBCUTANEOUS at 05:40

## 2018-01-28 RX ADMIN — Medication 1 TABLET: at 09:01

## 2018-01-28 RX ADMIN — ACETAMINOPHEN 650 MG: 325 TABLET ORAL at 09:06

## 2018-01-28 NOTE — PROGRESS NOTES
[facility] Internal Medicine Progress Note  Patient: Korey Stone 80 y o  male   MRN: [id number]  PCP: Meek Porras MD  Unit/Bed#: -01 Encounter: [id number]  Date Of Visit: 01/28/18    Assessment:    Principal Problem:    Community acquired pneumonia  Active Problems:    Hip pain    COPD without exacerbation ([facility] )    Acute renal failure ([facility] )    Dementia    CAD (coronary artery disease)    Moderate aortic stenosis    Chronic systolic CHF (congestive heart failure) ([facility] )    Falls    Anemia    Constipation      Plan:    · May require pneumonia: This is stable  Continue with ceftriaxone azithromycin  Continue with supplemental oxygen  The patient does have chronic hypoxemic respiratory failure and is on oxygen at home  · COPD:  This is without exacerbation  Continue with respiratory protocol  ·   · Coronary artery disease:  Continue with home medications and monitor  · Gait dysfunction  There is no evidence of any fractures  Patient could be having a lot of pelvic discomfort secondary to constipation  · Constipation:  Patient did have a large bowel movement yesterday  Continue with MiraLax as needed  Patient also got soapsuds enema  · Advanced dementia:  The the patient will benefit from long-term rehab  · Acute renal failure with chronic kidney disease stage III  Creatinine is improving  Continue to monitor  Patient is currently not on any fluids but creatinine is improving so will continue to monitor  · No evidence urinary tract infection      VTE Pharmacologic Prophylaxis:   Pharmacologic: Heparin  Mechanical VTE Prophylaxis in Place: Yes    Patient Centered Rounds: I have performed bedside rounds with nursing staff today  Education and Discussions with Family / Patient:  Discussed with the wife at the bedside    Time Spent for Care: 20 minutes  More than 50% of total time spent on counseling and coordination of care as described above      Current Length of Stay: 2 day(s)    Current Patient Status: Inpatient   Certification Statement: The patient will continue to require additional inpatient hospital stay due to Needing IV antibiotics as well as monitor the creatinine and placement    Discharge Plan:  Anticipate discharge in 24-48 hours    Code Status: Level 3 - DNAR and DNI      Subjective:   Patient seen examined  He is a very poor historian secondary to his advanced dementia  Objective:     Vitals:   Temp (24hrs), Av 3 °F (37 4 °C), Min:98 °F (36 7 °C), Max:101 5 °F (38 6 °C)    HR:  [76-99] 76  Resp:  [17-19] 18  BP: (102-110)/(51-61) 103/51  SpO2:  [94 %-99 %] 94 %  Body mass index is 19 16 kg/m²       Input and Output Summary (last 24 hours):     No intake or output data in the 24 hours ending 18 1020    Physical Exam:     General Appearance:    Alert, cooperative, no distress, appears stated age                               Lungs:     Clear to auscultation bilaterally, respirations unlabored       Heart:    Regular rate and rhythm, S1 and S2 normal, no murmur, rub    or gallop   Abdomen:     Soft, non-tender, bowel sounds active all four quadrants,     no masses, no organomegaly           Extremities:   Extremities normal, atraumatic, no cyanosis or edema                       Additional Data:     Labs:      Results from last 7 days  Lab Units 18  0504 18  1254   WBC Thousand/uL 14 07* 19 69*   HEMOGLOBIN g/dL 9 6* 10 4*   HEMATOCRIT % 29 0* 31 6*   PLATELETS Thousands/uL 308 320   LYMPHO PCT %  --  11*   MONO PCT MAN %  --  5   EOSINO PCT MANUAL %  --  0       Results from last 7 days  Lab Units 18  0504 18  1254   SODIUM mmol/L 139 139   POTASSIUM mmol/L 4 8 4 2   CHLORIDE mmol/L 106 102   CO2 mmol/L 24 24   BUN mg/dL 32* 38*   CREATININE mg/dL 1 55* 1 66*   CALCIUM mg/dL 9 0 9 5   TOTAL PROTEIN g/dL  --  9 3*   BILIRUBIN TOTAL mg/dL  --  0 40   ALK PHOS U/L  --  100   ALT U/L  --  37   AST U/L  --  48*   GLUCOSE RANDOM mg/dL 134 143*           * I Have Reviewed All Lab Data Listed Above  * Additional Pertinent Lab Tests Reviewed: [address] [facility] Admission Reviewed        Recent Cultures (last 7 days):       Results from last 7 days  Lab Units 01/26/18  1531   BLOOD CULTURE  No Growth at 24 hrs  No Growth at 24 hrs  Last 24 Hours Medication List:     atorvastatin 40 mg Oral Daily With Dinner   azithromycin 250 mg Oral Q24H   cefTRIAXone 1,000 mg Intravenous Q24H   cholecalciferol 1,000 Units Oral Daily   heparin (porcine) 5,000 Units Subcutaneous Q8H [address]   multivitamin-minerals 1 tablet Oral Daily   polyethylene glycol 17 g Oral Daily        Today, Patient Was Seen By: Keaton Augustin MD    ** Please Note: Dragon 360 Dictation voice to text software may have been used in the creation of this document   **

## 2018-01-28 NOTE — PHYSICIAN ADVISOR
Current patient class: Inpatient  The patient is currently on Hospital Day: 2      The patient was admitted to the hospital at [phone] on 1/26/18 for the following diagnosis:  Groin pain [R10 30]  Right hip pain [M25 551]  Right upper lobe pneumonia ([address] ) [J18 1]       There is documentation in the medical record of an expected length of stay of at least 2 midnights  The patient is therefore expected to satisfy the 2 midnight benchmark and given the 2 midnight presumption is appropriate for INPATIENT ADMISSION  Given this expectation of a satisfying stay, CMS instructs us that the patient is most often appropriate for inpatient admission under part A provided medical necessity is documented in the chart  After review of the relevant documentation, labs, vital signs and test results, the patient is appropriate for INPATIENT ADMISSION  Admission to the hospital as an inpatient is a complex decision making process which requires the practitioner to consider the patients presenting complaint, history and physical examination and all relevant testing  With this in mind, in this case, the patient was deemed appropriate for INPATIENT ADMISSION  After review of the documentation and testing available at the time of the admission I concur with this clinical determination of medical necessity  Rationale is as follows:     The patient is a 80 yrs old Male who presented to the ED at 1/26/2018 11:40 AM with a chief complaint of Fall (pt fell 3 days ago c/o groin pain and inability to stand or walk today)    The patients vitals on arrival were ED Triage Vitals   Temperature Pulse Respirations Blood Pressure SpO2   01/26/18 1156 01/26/18 1156 01/26/18 1156 01/26/18 1156 01/26/18 1156   98 5 °F (36 9 °C) 98 18 (!) 90/47 95 %      Temp Source Heart Rate Source Patient Position - Orthostatic VS BP Location FiO2 (%)   01/27/18 0937 01/26/18 1422 01/26/18 1422 01/26/18 1422 --   Oral Monitor Lying Right arm       Pain Score 01/26/18 1156       No Pain           Past Medical History:   Diagnosis Date    CAD (coronary artery disease)     [facility]     CKD (chronic kidney disease) stage 3, GFR 30-59 ml/min     Emphysema lung (HCC)     HLD (hyperlipidemia)     HTN (hypertension)     [facility] )      Past Surgical History:   Procedure Laterality Date    CARDIAC SURGERY      EYE SURGERY      GASTRECTOMY             Consults have been placed to:   IP CONSULT TO CASE MANAGEMENT    Vitals:    01/27/18 0937 01/27/18 1300 01/27/18 1500 01/27/18 2050   BP: 93/50  102/53    BP Location: Left arm  Left arm    Pulse: 83  99    Resp: 16  17    Temp: 98 4 °F (36 9 °C)  98 2 °F (36 8 °C) (!) 101 5 °F (38 6 °C)   TempSrc: Oral  Oral Oral   SpO2: 94% 94% 97%    Weight:   67 7 kg (149 lb 4 oz)    Height:   6' 2" (1 88 m)        Most recent labs:    Recent Labs      01/26/18   1254  01/27/18   0504   WBC  19 69*  14 07*   HGB  10 4*  9 6*   HCT  31 6*  29 0*   PLT  320  308   K  4 2  4 8   NA  139  139   CALCIUM  9 5  9 0   BUN  38*  32*   CREATININE  1 66*  1 55*   TROPONINI  0 02   --    AST  48*   --    ALT  37   --    ALKPHOS  100   --    BILITOT  0 40   --        Scheduled Meds:  atorvastatin 40 mg Oral Daily With Dinner   azithromycin 250 mg Oral Q24H   cefTRIAXone 1,000 mg Intravenous Q24H   cholecalciferol 1,000 Units Oral Daily   heparin (porcine) 5,000 Units Subcutaneous Q8H [facility]   multivitamin-minerals 1 tablet Oral Daily   polyethylene glycol 17 g Oral Daily     Continuous Infusions:   PRN Meds:   acetaminophen    albuterol    ondansetron    Surgical procedures (if appropriate):

## 2018-01-28 NOTE — PROGRESS NOTES
Patient family at bedside, two daughters and wife  Family is requesting briefs for patients incontinence  One daughter who is a nurse states that the patient has non-blanchable redness on his bottom and "you need to do something about it"  2 RN assessment done; patient has blanchable redness on bony prominences on b/l hips and sacrum  Small area of moisture associated breakdown near buttocks, calmazine applied  hydra guard applied to sacrum, hips, and heels, and both knees  Family educated about the use of incontinence pads in lieu of briefs

## 2018-01-28 NOTE — SOCIAL WORK
CM met with pt along with wife Lesvia Posey and children at bedside  Pt lives in a bilevel home with 4 steps w/railing to enter the residence and then another 5 steps w/railing to reach the main living quarters  Pt has difficulty navigating steps due to SOB and leg weakness  He uses a cane or walker to ambulate  He also uses 02 at 2lpm at hs and with exertion and a nebulizer prn  He was in cardiac rehab at [facility] following a CABG 8 years ago  He has also used OP/PT with [facility] in [address] [place] [address] for the leg weakness 3 years ago  He currently is going to the [facility] to exercise  His PCP is Dr Gregg Treviño  Denies substance abuse issues  He uses [facility] [place] [facility] [employer] for routine meds  He has an Advanced Directive and his POA is his wife Lesvia Posey  He is retired and does not drive  Wife Lesvia Posey transports to [facility]  CM discussed discharge needs and family is in agreement with Dr Gregg Treviño that STR would be the best option when he is medically cleared  Lesvia Posey would like to tour the facilities before making any decision  Their daughter works at [employer]  CM supplied list of skilled nursing and rehab facilities  CM will continue to follow  CM reviewed discharge planning process including the following: identifying help at home, patient preference for discharge planning needs, pharmacy preference, and availability of treatment team to discuss questions or concerns patient and/or family may have regarding understanding medications and recognizing signs and symptoms once discharged  CM also encouraged patient to follow up with all recommended appointments after discharge  Patient advised of importance for patient and family to participate in managing patients medical well being

## 2018-01-28 NOTE — PLAN OF CARE
Problem: DISCHARGE PLANNING - CARE MANAGEMENT  Goal: Discharge to post-acute care or home with appropriate resources  INTERVENTIONS:  - Conduct assessment to determine patient/family and health care team treatment goals, and need for post-acute services based on payer coverage, community resources, and patient preferences, and barriers to discharge  - Address psychosocial, clinical, and financial barriers to discharge as identified in assessment in conjunction with the patient/family and health care team  - Arrange appropriate level of post-acute services according to patient's   needs and preference and payer coverage in collaboration with the physician and health care team  - Communicate with and update the patient/family, physician, and health care team regarding progress on the discharge plan  - Arrange appropriate transportation to post-acute venues  Outcome: Progressing  CM met with pt along with wife Janes Kruger and children at bedside  Pt lives in a bilevel home with 4 steps w/railing to enter the residence and then another 5 steps w/railing to reach the main living quarters  Pt has difficulty navigating steps due to SOB and leg weakness  He uses a cane or walker to ambulate  He also uses 02 at 2lpm at hs and with exertion and a nebulizer prn  He was in cardiac rehab at [facility] following a CABG 8 years ago  He has also used OP/PT with [facility] in [facility] for the leg weakness 3 years ago  He currently is going to the [facility] to exercise  His PCP is Dr Crissy Hutchins  Denies substance abuse issues  He uses [facility] [place] [employer] [facility] for routine meds  He has an Advanced Directive and his POA is his wife Janes Kruger  He is retired and does not drive  Wife Janes Kruger transports to [facility]  CM discussed discharge needs and family is in agreement with Dr Crissy Hutchins that STR would be the best option when he is medically cleared  Janes Kruger would like to tour the facilities before making any decision    Their daughter works at [employer]  CM supplied list of skilled nursing and rehab facilities  CM will continue to follow  CM reviewed discharge planning process including the following: identifying help at home, patient preference for discharge planning needs, pharmacy preference, and availability of treatment team to discuss questions or concerns patient and/or family may have regarding understanding medications and recognizing signs and symptoms once discharged  CM also encouraged patient to follow up with all recommended appointments after discharge  Patient advised of importance for patient and family to participate in managing patients medical well being

## 2018-01-29 VITALS
RESPIRATION RATE: 18 BRPM | TEMPERATURE: 98.3 F | WEIGHT: 149.25 LBS | OXYGEN SATURATION: 90 % | HEIGHT: 74 IN | HEART RATE: 90 BPM | BODY MASS INDEX: 19.15 KG/M2 | SYSTOLIC BLOOD PRESSURE: 120 MMHG | DIASTOLIC BLOOD PRESSURE: 60 MMHG

## 2018-01-29 LAB
ANION GAP SERPL CALCULATED.3IONS-SCNC: 8 MMOL/L (ref 4–13)
BUN SERPL-MCNC: 19 MG/DL (ref 5–25)
CALCIUM SERPL-MCNC: 8.5 MG/DL (ref 8.3–10.1)
CHLORIDE SERPL-SCNC: 105 MMOL/L (ref 100–108)
CO2 SERPL-SCNC: 24 MMOL/L (ref 21–32)
CREAT SERPL-MCNC: 1.37 MG/DL (ref 0.6–1.3)
ERYTHROCYTE [DISTWIDTH] IN BLOOD BY AUTOMATED COUNT: 13.2 % (ref 11.6–15.1)
GFR SERPL CREATININE-BSD FRML MDRD: 47 ML/MIN/1.73SQ M
GLUCOSE SERPL-MCNC: 148 MG/DL (ref 65–140)
HCT VFR BLD AUTO: 25.9 % (ref 36.5–49.3)
HGB BLD-MCNC: 8.6 G/DL (ref 12–17)
MCH RBC QN AUTO: 31.2 PG (ref 26.8–34.3)
MCHC RBC AUTO-ENTMCNC: 33.2 G/DL (ref 31.4–37.4)
MCV RBC AUTO: 94 FL (ref 82–98)
PLATELET # BLD AUTO: 308 THOUSANDS/UL (ref 149–390)
PMV BLD AUTO: 9.1 FL (ref 8.9–12.7)
POTASSIUM SERPL-SCNC: 4.1 MMOL/L (ref 3.5–5.3)
RBC # BLD AUTO: 2.76 MILLION/UL (ref 3.88–5.62)
SODIUM SERPL-SCNC: 137 MMOL/L (ref 136–145)
WBC # BLD AUTO: 9.5 THOUSAND/UL (ref 4.31–10.16)

## 2018-01-29 PROCEDURE — 97116 GAIT TRAINING THERAPY: CPT

## 2018-01-29 PROCEDURE — 85027 COMPLETE CBC AUTOMATED: CPT | Performed by: FAMILY MEDICINE

## 2018-01-29 PROCEDURE — 99239 HOSP IP/OBS DSCHRG MGMT >30: CPT | Performed by: INTERNAL MEDICINE

## 2018-01-29 PROCEDURE — 97110 THERAPEUTIC EXERCISES: CPT

## 2018-01-29 PROCEDURE — 80048 BASIC METABOLIC PNL TOTAL CA: CPT | Performed by: FAMILY MEDICINE

## 2018-01-29 RX ORDER — POLYETHYLENE GLYCOL 3350 17 G/17G
17 POWDER, FOR SOLUTION ORAL DAILY
Qty: 14 EACH | Refills: 0 | Status: SHIPPED | OUTPATIENT
Start: 2018-01-30

## 2018-01-29 RX ORDER — CEFUROXIME AXETIL 250 MG/1
250 TABLET ORAL EVERY 12 HOURS SCHEDULED
Qty: 10 TABLET | Refills: 0 | Status: SHIPPED | OUTPATIENT
Start: 2018-01-29 | End: 2018-02-03

## 2018-01-29 RX ADMIN — CHOLECALCIFEROL TAB 25 MCG (1000 UNIT) 1000 UNITS: 25 TAB at 10:06

## 2018-01-29 RX ADMIN — POLYETHYLENE GLYCOL 3350 17 G: 17 POWDER, FOR SOLUTION ORAL at 10:07

## 2018-01-29 RX ADMIN — Medication 1 TABLET: at 10:06

## 2018-01-29 RX ADMIN — AZITHROMYCIN 250 MG: 250 TABLET, FILM COATED ORAL at 10:06

## 2018-01-29 RX ADMIN — HEPARIN SODIUM 5000 UNITS: 5000 INJECTION, SOLUTION INTRAVENOUS; SUBCUTANEOUS at 05:34

## 2018-01-29 NOTE — SOCIAL WORK
CM met with pt and wife at bedside  Pt to be discharged to [facility] today via [address] at 13:30  IMM reviewed  No questions  Copy to pt an copy to MR for scanning

## 2018-01-29 NOTE — PLAN OF CARE
Problem: PHYSICAL THERAPY ADULT  Goal: Performs mobility at highest level of function for planned discharge setting  See evaluation for individualized goals  Treatment/Interventions: Functional transfer training, LE strengthening/ROM, Elevations, Therapeutic exercise, Endurance training, Patient/family training, Bed mobility, Gait training, Equipment eval/education, Spoke to nursing, Family          See flowsheet documentation for full assessment, interventions and recommendations  Outcome: Progressing  Prognosis: Fair  Problem List: Decreased strength, Decreased endurance, Impaired balance, Decreased mobility, Decreased cognition, Impaired judgement, Decreased safety awareness, Impaired hearing  Assessment: pt demonstrating progress c PT  required mod (A)x2 for OOB mobility tasks c max cues for initiation + re-direction to task  pt easily distracted + has limited ability to follow commands  ambulated 3'x2 c RW chair<>BSC c max cues for sequencing + RW advancement  requires cues for upright posture c fwd gaze in standing  tolerated standing c RW x2min for donning of brief + back pericare  initiated B/L LE ther ex in sitting/supine x10 reps c AAROM c max cues for technique  remained OOB in chair at end of session c chair alarm activated  will cont skilled PT to further maximize functional mobility + improve quality of life  upon d/c, recommend STR  Barriers to Discharge: Inaccessible home environment, Decreased caregiver support     Recommendation: Short-term skilled PT     PT - OK to Discharge: Yes (to STR )    See flowsheet documentation for full assessment

## 2018-01-29 NOTE — DISCHARGE SUMMARY
Discharge Summary - Ramon Alston 80 y o  male MRN: [id number]  Unit/Bed#: -01 Encounter: [id number]    Admission Date:    1/26/2018   Discharge Date:   01/29/18   Admitting Diagnosis:   Groin pain [R10 30]  Right hip pain [M25 551]  Right upper lobe pneumonia [facility]) [J18 1]  Admitting Provider:   Rose Napier MD  Discharge Provider:   Fabiola Olmstead MD     Primary Care Physician at Discharge:   Fabiola Olmstead MD,[phone]    HPI:   Ramon Alston is a 80 y o  male who presents with difficulty walking  History is obtained from the patient's family at the bedside secondary to the patient having advanced dementia  According to the family the patient fell about 4-5 days ago  He fell down 4-5 carpeted steps  At that time he was doing fine and he was in his usual state of health however this morning when the wife tried to get him up he was unable to bear any weight  He complained of right hip pain earlier today and then when he was here he complained of left hip pain  He had difficulty with any kind of movement or flexion of the hip as well  The patient does have a cough and shortness of breath but family states that is his baseline  When asked about any urinary complaints the patient has been having increased frequency of urination lately  When asked about any fevers or chills the family states that he is always cold but no documented fevers were noted          Procedures Performed:   No orders of the defined types were placed in this encounter  Hospital Course:   Patient was admitted with chronic hypoxemic respiratory failure exacerbated by right upper lobe pneumonia in the setting of severe oxygen-dependent COPD  He was treated with Zithromax and Rocephin and clinically improved  He continued on oxygen and bronchodilators as needed  He remained afebrile and white blood cell count decreased from 19,000 on admission to 9000 on the day of discharge      Hypertension-blood pressures were low initially, Toprol XL was discontinued and blood pressures normalized throughout the hospitalization  Chronic kidney disease stage 3-he was admitted with creatinine 1 66 which improved to baseline 1 37 after hydration  Gait dysfunction-patient had falls prior to this admission  He was evaluated by Physical therapy and felt to be a good candidate for inpatient subacute rehab which was arranged at [facility]  He was constipated upon presentation  He had soapsuds enema which resulted in evacuation of bowels as well as being started on MiraLax which will be continued in the outpatient setting  Patient has advanced dementia  His history and review of systems was completely unreliable during this admission  Lengthy discussion was had at the bedside with patient, daughter, Christiano Lopez and wife, Tra Churchill as well as granddaughter Robert Lind  Plan will be discharge for subacute short-term rehab with consideration for returning home with assistance versus long-term care  Consulting Providers   Pulmonary    Significant Findings, Care, Treatment and Services Provided:   Chest x-ray:IMPRESSION:     Right upper lobe opacity potential reflecting pneumonia      Emphysema  X-ray hip and pelvis:IMPRESSION:     No acute osseous abnormality  CT scan abdomen and pelvis:IMPRESSION:     No evidence for pelvic fracture      Presumed remote compression fractures of T12 and L2  Correlate for point tenderness      Partially visualized bilateral lower lobe consolidation      Moderate fecal burden within the rectum        Complications:     Physical Exam:  General Appearance:    Alert oriented only to self, cooperative, no distress   Head:    Normocephalic, without obvious abnormality, atraumatic   Eyes:    PERRL, conjunctiva/corneas clear, EOM's intact               Neck:   Supple, symmetrical, trachea midline, no JVD   Lungs:   Bibasilar crackles, respirations unlabored   Heart:    Regular rate and rhythm, S1 and S2 normal, to 3/6 systolic murmur, no rub   or gallop   Abdomen: Soft, non-tender, positive bowel sounds, no masses, no organomegaly   Extremities:  No pedal edema, calf tenderness  Distal pulses palpable  Neurologic:     CNII-XII intact  Labs:   Lab Results   Component Value Date    WBC 9 50 01/29/2018    WBC 7 82 11/03/2015    RBC 2 76 (L) 01/29/2018    RBC 3 81 (L) 11/03/2015    HGB 8 6 (L) 01/29/2018    HGB 12 0 11/03/2015    HCT 25 9 (L) 01/29/2018    HCT 35 3 (L) 11/03/2015    MCV 94 01/29/2018    MCV 93 11/03/2015    MCH 31 2 01/29/2018    MCH 31 5 11/03/2015    RDW 13 2 01/29/2018    RDW 14 4 11/03/2015     01/29/2018     11/03/2015     Lab Results   Component Value Date    CREATININE 1 37 (H) 01/29/2018    CREATININE 1 37 (H) 11/03/2015    BUN 19 01/29/2018    BUN 20 11/03/2015     01/29/2018     11/03/2015    K 4 1 01/29/2018    K 4 5 11/03/2015     01/29/2018     (H) 11/03/2015    CO2 24 01/29/2018    CO2 24 11/03/2015    GLUCOSE 148 (H) 01/29/2018    GLUCOSE 111 11/03/2015    PROT 9 3 (H) 01/26/2018    PROT 9 1 (H) 11/16/2015    PROT 9 1 (H) 11/03/2015    ALKPHOS 100 01/26/2018    ALKPHOS 122 (H) 11/03/2015    ALT 37 01/26/2018    ALT 30 11/03/2015    AST 48 (H) 01/26/2018    AST 27 11/03/2015    BILIDIR 0 11 12/02/2017    BILIDIR 0 14 11/03/2015       Treatments:  antibiotics: ceftriaxone and azithromycin    Discharge Diagnosis:   Principal Problem:    Community acquired pneumonia  Active Problems:    Hip pain    COPD without exacerbation (HCC)    Acute renal failure (HCC)    Dementia    CAD (coronary artery disease)    Moderate aortic stenosis    Chronic systolic CHF (congestive heart failure) ([facility] [address] )    Falls    Anemia    Constipation  Resolved Problems:    * No resolved hospital problems   *      Condition at Discharge:   Good     Code Status: Level 3 - DNAR and DNI  Advance Directive and Living Will: <no information>  Power of : Yes  POLST:      Discharge instructions/Information to patient and family:   See after visit summary for information provided to patient and family  Provisions for Follow-Up Care:  See after visit summary for information related to follow-up care and any pertinent home health orders  Disposition:   Skilled nursing facility at [facility]    Planned Readmission:   No    Discharge Statement   I spent 35 minutes discharging the patient  This time was spent on the day of discharge  I had direct contact with the patient on the day of discharge  Additional documentation is required if more than 30 minutes were spent on discharge  Greater than 50% of the total time was spent examining patient, answering all patient questions, arranging and discussing plan of care with patient as well as directly providing post-discharge instructions  Additional time then spent on discharge activities including lengthy discussion with family regarding long-term care issues  Discharge Medications:  See after visit summary for reconciled discharge medications provided to patient and family        Chikis Avery MD  1/29/2018,12:29 PM

## 2018-01-29 NOTE — PLAN OF CARE
DISCHARGE PLANNING - CARE MANAGEMENT     Discharge to post-acute care or home with appropriate resources Adequate for Discharge        Nutrition/Hydration-ADULT     Nutrient/Hydration intake appropriate for improving, restoring or maintaining nutritional needs Adequate for Discharge        Potential for Falls     Patient will remain free of falls Adequate for Discharge        Prexisting or High Potential for Compromised Skin Integrity     Skin integrity is maintained or improved Adequate for Discharge

## 2018-01-29 NOTE — PHYSICAL THERAPY NOTE
PT Progress Note (38min)  (12:00-12:38)       01/29/18 1238   Pain Assessment   Pain Assessment No/denies pain   Restrictions/Precautions   Other Precautions Cognitive; Chair Alarm; Bed Alarm; Fall Risk;Hard of hearing   General   Chart Reviewed Yes   Response to Previous Treatment Patient with no complaints from previous session  Family/Caregiver Present Yes  (pt's dtr)   Cognition   Overall Cognitive Status Impaired   Attention Difficulty attending to directions   Orientation Level Oriented to person   Following Commands Follows one step commands with increased time or repetition   Subjective   Subjective pt OOB in chair  dtr present + supportive of PT session  pt required frequent re-direction to initiate mobility  Transfers   Sit to Stand 3  Moderate assistance   Additional items Assist x 2;Verbal cues; Increased time required;Armrests   Stand to Sit 3  Moderate assistance   Additional items Assist x 2;Verbal cues;Armrests   Toilet transfer 3  Moderate assistance   Additional items Assist x 2;Armrests; Verbal cues; Commode; Increased time required   Ambulation/Elevation   Gait pattern Narrow ARMINDA; Forward Flexion;Decreased foot clearance; Short stride   Gait Assistance 3  Moderate assist   Additional items Assist x 2;Verbal cues   Assistive Device Rolling walker   Distance 3'x2 c seated rest for toileting btwn trials   Balance   Static Sitting Fair -   Dynamic Sitting Fair -   Static Standing Poor  (standing tolerance 2min for donning of brief + pericare)   Dynamic Standing Poor   Ambulatory Poor   Activity Tolerance   Activity Tolerance Patient limited by fatigue   Nurse Made Aware Ali   Exercises   Heelslides Sitting;10 reps;AAROM; Bilateral   Hip Abduction Sitting;10 reps;AAROM; Bilateral;Supine  (x2 sets)   Hip Adduction Sitting;10 reps;AAROM; Bilateral   Knee AROM Long Arc Quad Supine;10 reps;AAROM; Bilateral  (SLR)   Marching Sitting;10 reps;AAROM; Bilateral   Assessment   Prognosis Fair   Problem List Decreased strength;Decreased endurance; Impaired balance;Decreased mobility; Decreased cognition; Impaired judgement;Decreased safety awareness; Impaired hearing   Assessment pt demonstrating progress c PT  required mod (A)x2 for OOB mobility tasks c max cues for initiation + re-direction to task  pt easily distracted + has limited ability to follow commands  ambulated 3'x2 c RW chair<>BSC c max cues for sequencing + RW advancement  requires cues for upright posture c fwd gaze in standing  tolerated standing c RW x2min for donning of brief + back pericare  initiated B/L LE ther ex in sitting/supine x10 reps c AAROM c max cues for technique  remained OOB in chair at end of session c chair alarm activated  will cont skilled PT to further maximize functional mobility + improve quality of life  upon d/c, recommend STR  Barriers to Discharge Inaccessible home environment;Decreased caregiver support   Goals   Patient Goals none expressed   STG Expiration Date 02/06/18   Treatment Day 1   Plan   Treatment/Interventions Functional transfer training;LE strengthening/ROM; Therapeutic exercise; Endurance training;Patient/family training;Bed mobility;Gait training;Equipment eval/education;Spoke to nursing;Spoke to case management; Family   Progress Slow progress, cognitive deficits   PT Frequency 5x/wk   Recommendation   Recommendation Short-term skilled PT   PT - OK to Discharge Yes  (to STR )     Anderson Falcon PT

## 2018-01-29 NOTE — PLAN OF CARE
Problem: DISCHARGE PLANNING - CARE MANAGEMENT  Goal: Discharge to post-acute care or home with appropriate resources  INTERVENTIONS:  - Conduct assessment to determine patient/family and health care team treatment goals, and need for post-acute services based on payer coverage, community resources, and patient preferences, and barriers to discharge  - Address psychosocial, clinical, and financial barriers to discharge as identified in assessment in conjunction with the patient/family and health care team  - Arrange appropriate level of post-acute services according to patient's   needs and preference and payer coverage in collaboration with the physician and health care team  - Communicate with and update the patient/family, physician, and health care team regarding progress on the discharge plan  - Arrange appropriate transportation to post-acute venues   Outcome: Completed Date Met: 01/29/18  CM met with pt and wife at bedside  Pt to be discharged to [facility] via [address] at 13:30  IMM reviewed  No questions  Copy to pt an copy to MR for scanning

## 2018-01-29 NOTE — PROGRESS NOTES
Attempted to call [facility] multiple times for report  No answer  Will continue to try  Informed case management and charge

## 2018-01-30 ENCOUNTER — TRANSITIONAL CARE MANAGEMENT (OUTPATIENT)
Dept: INTERNAL MEDICINE CLINIC | Facility: CLINIC | Age: 83
End: 2018-01-30

## 2018-01-31 LAB
BACTERIA BLD CULT: NORMAL
BACTERIA BLD CULT: NORMAL

## 2018-02-21 ENCOUNTER — TELEPHONE (OUTPATIENT)
Dept: INTERNAL MEDICINE CLINIC | Facility: CLINIC | Age: 83
End: 2018-02-21

## 2018-03-06 ENCOUNTER — OFFICE VISIT (OUTPATIENT)
Dept: INTERNAL MEDICINE CLINIC | Facility: CLINIC | Age: 83
End: 2018-03-06
Payer: MEDICARE

## 2018-03-06 ENCOUNTER — TRANSITIONAL CARE MANAGEMENT (OUTPATIENT)
Dept: INTERNAL MEDICINE CLINIC | Facility: CLINIC | Age: 83
End: 2018-03-06

## 2018-03-06 VITALS
SYSTOLIC BLOOD PRESSURE: 110 MMHG | HEIGHT: 74 IN | WEIGHT: 137.6 LBS | DIASTOLIC BLOOD PRESSURE: 48 MMHG | RESPIRATION RATE: 20 BRPM | HEART RATE: 60 BPM | BODY MASS INDEX: 17.66 KG/M2
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
## 2018-03-06 DIAGNOSIS — J18.9 COMMUNITY ACQUIRED PNEUMONIA OF RIGHT UPPER LOBE OF LUNG: Primary | ICD-10-CM

## 2018-03-06 DIAGNOSIS — I50.22 CHRONIC SYSTOLIC CHF (CONGESTIVE HEART FAILURE) (HCC): ICD-10-CM

## 2018-03-06 DIAGNOSIS — I25.810 CORONARY ARTERY DISEASE INVOLVING CORONARY BYPASS GRAFT OF NATIVE HEART WITHOUT ANGINA PECTORIS: ICD-10-CM

## 2018-03-06 DIAGNOSIS — J44.9 COPD WITHOUT EXACERBATION (HCC): ICD-10-CM

## 2018-03-06 DIAGNOSIS — G30.9 ALZHEIMER'S DEMENTIA WITHOUT BEHAVIORAL DISTURBANCE, UNSPECIFIED TIMING OF DEMENTIA ONSET (HCC): ICD-10-CM

## 2018-03-06 DIAGNOSIS — F02.80 ALZHEIMER'S DEMENTIA WITHOUT BEHAVIORAL DISTURBANCE, UNSPECIFIED TIMING OF DEMENTIA ONSET (HCC): ICD-10-CM

## 2018-03-06 DIAGNOSIS — I35.0 MODERATE AORTIC STENOSIS: ICD-10-CM

## 2018-03-06 PROCEDURE — 99214 OFFICE O/P EST MOD 30 MIN: CPT | Performed by: INTERNAL MEDICINE

## 2018-03-06 RX ORDER — ECHINACEA PURPUREA EXTRACT 125 MG
1 TABLET ORAL AS NEEDED
COMMUNITY
End: 2018-07-03

## 2018-03-06 RX ORDER — LOSARTAN POTASSIUM 50 MG/1
25 TABLET ORAL DAILY
COMMUNITY
End: 2018-07-03

## 2018-03-06 NOTE — PROGRESS NOTES
Assessment/Plan:    No problem-specific Assessment & Plan notes found for this encounter  Diagnoses and all orders for this visit:    Community acquired pneumonia of right upper lobe of lung ([facility] [place] [address] )    [facility]    Coronary artery disease involving coronary bypass graft of native heart without angina pectoris    Moderate aortic stenosis    Chronic systolic CHF (congestive heart failure) ([facility] [place] [address] )    Alzheimer's dementia without behavioral disturbance, unspecified timing of dementia onset    Fall, initial encounter    Other orders  -     losartan (COZAAR) 50 mg tablet; Take 25 mg by mouth daily  -     sodium chloride (OCEAN) 0 65 % nasal spray; 1 spray into each nostril as needed for congestion        Patient Instructions     80year-old gentleman extensive past medical history with significant recent decline in functional status coupled with advanced dementia -POLST form discussed  Currently, patient is not hospice appropriate  All are in agreement with comfort care and maintenance of dignity  Decisions to be made in the future regarding hospitalization, treatment with antibiotics or other interventions will be based upon clinical situation and the expectation of ongoing comfort as goal of care  Jovanny Pace to call me with any decline in status that may prompt hospice liaison visit to the home  Follow-up will be on a p r n  Basis  Subjective:      Patient ID: Riri Mcwilliams is a 80 y o  male  [employer] [facility] [employer] is present with his wife and stepdaughter to follow-up recent hospitalization and convalescence at [facility]  He was discharged on January 29th and discharged from [facility] March 2nd  He was admitted with pneumonia, weakness and several falls  He received physical therapy, occupational therapy and speech therapy and is ambulatory with the assistance of at least 1 and a rolling walker  He has had no subsequent falls but continues to have lower extremity weakness    Family members are taking him to the bathroom every 2 hours in order to avoid incontinence  Patient is unable to give any reliable history today  The following portions of the patient's history were reviewed and updated as appropriate: allergies, current medications, past family history, past medical history, past social history, past surgical history and problem list     Review of Systems   Constitutional: Negative for appetite change, chills, diaphoresis, fatigue, fever and unexpected weight change  HENT: Negative for congestion, hearing loss and rhinorrhea  Eyes: Negative for visual disturbance  Respiratory: Negative for cough, chest tightness, shortness of breath and wheezing  Cardiovascular: Negative for chest pain, palpitations and leg swelling  Gastrointestinal: Negative for abdominal pain and blood in stool  Endocrine: Negative for cold intolerance, heat intolerance, polydipsia and polyuria  Genitourinary: Negative for difficulty urinating, dysuria, frequency and urgency  Musculoskeletal: Negative for arthralgias and myalgias  Skin: Negative for rash  Neurological: Positive for weakness  Negative for dizziness, light-headedness and headaches  Hematological: Does not bruise/bleed easily  Psychiatric/Behavioral: Negative for dysphoric mood and sleep disturbance  Objective:      BP (!) 110/48 (BP Location: Left arm, Patient Position: Sitting)   Pulse 60   Resp 20   Ht 6' 2" (1 88 m)   Wt 62 4 kg (137 lb 9 6 oz)   BMI 17 67 kg/m²          Physical Exam   Constitutional: He is oriented to person, place, and time  He appears well-developed and well-nourished  HENT:   Head: Normocephalic and atraumatic  Nose: Nose normal    Mouth/Throat: Oropharynx is clear and moist    Eyes: Conjunctivae and EOM are normal  Pupils are equal, round, and reactive to light  No scleral icterus  Neck: Normal range of motion  Neck supple  No JVD present  No tracheal deviation present   No thyromegaly present  Cardiovascular: Normal rate, regular rhythm and intact distal pulses  Exam reveals no gallop and no friction rub  Murmur heard  Pulmonary/Chest: Effort normal and breath sounds normal  No respiratory distress  He has no wheezes  He has no rales  Abdominal: Soft  Bowel sounds are normal    Musculoskeletal: He exhibits no edema or tenderness  Lymphadenopathy:     He has no cervical adenopathy  Neurological: He is alert and oriented to person, place, and time  No cranial nerve deficit  Skin: Skin is warm and dry  No rash noted  No erythema  Psychiatric: He has a normal mood and affect   His behavior is normal  Judgment and thought content normal

## 2018-03-06 NOTE — PATIENT INSTRUCTIONS
80year-old gentleman extensive past medical history with significant recent decline in functional status coupled with advanced dementia -POLST form discussed  Currently, patient is not hospice appropriate  All are in agreement with comfort care and maintenance of dignity  Decisions to be made in the future regarding hospitalization, treatment with antibiotics or other interventions will be based upon clinical situation and the expectation of ongoing comfort as goal of care  Leidy Ramirez to call me with any decline in status that may prompt hospice liaison visit to the home  Follow-up will be on a p r n  Basis

## 2018-03-16 ENCOUNTER — TELEPHONE (OUTPATIENT)
Dept: INTERNAL MEDICINE CLINIC | Facility: CLINIC | Age: 83
End: 2018-03-16

## 2018-03-16 NOTE — TELEPHONE ENCOUNTER
Spoke w/ dr Arely Dominguez and he advised to still hold all bp meds and furosemide and potassium until an accurate bp Is checked  I spoke w/ wife and notified of this and also advised if he is feeling any worse to take to er    She said nurse is coming back mon or tues w/ new cuff and they will call office w/ reading

## 2018-03-16 NOTE — TELEPHONE ENCOUNTER
I called and spoke w/ Keesha Carpenter, nurse- she said there may have been a problem w/ her cuff bc the next pt's diastolic reading was 20, she told pt to check bp on their home machine  I called pt and spoke w/ wife adrián and she said they cant find home bp but pt is feeling very weak and tired today  She checked pill bottles and said pt does not have losartan, he is on valsartan, furosemide and potassium

## 2018-03-16 NOTE — TELEPHONE ENCOUNTER
Med list says he has valsartan, losartan   confirm what he has been taking, I recommend stopping all bp lower meds for now including valsartan/losartan, furosemide and potassium chloride

## 2018-03-16 NOTE — TELEPHONE ENCOUNTER
She is w/PT  Patricio Reyes Took BP couple  X's & it's very low @  90/40      She took it right around the same time that he took  Losartan 50 mg   Needs to know what Dr recommends    please call WEN Reyes She will be there for a little while longer

## 2018-03-16 NOTE — TELEPHONE ENCOUNTER
Printed and gave to [facility], she gave to dr Norah Kuhn  Per dr Norah Kuhn, stop bp meds and he will call pt later  I spoke w/ Peggy Roberson PT and notified     Message left on dr [employer], also forwarding message

## 2018-03-20 NOTE — TELEPHONE ENCOUNTER
FYI:   new BP reading as of today  3/20     9:00  110/62 , 9:30 125/65, 10:30 112/52    And that's for not taking any  bp meds as of Friday  3/16  Emanuel Celaya

## 2018-03-28 ENCOUNTER — TELEPHONE (OUTPATIENT)
Dept: INTERNAL MEDICINE CLINIC | Facility: CLINIC | Age: 83
End: 2018-03-28

## 2018-03-28 NOTE — TELEPHONE ENCOUNTER
Wendy Whiting / Loretta Therapist w/ [facility]    [facility] [phone]    Giving update on PT's Bp     Today 110/68,   Yesterday 116/50, 3/26/18 90/60  Padmini Julianica Last wk 100/60 3/22/18 122/54  Taken by her & the nurse

## 2018-05-24 DIAGNOSIS — I10 ESSENTIAL HYPERTENSION: Primary | ICD-10-CM

## 2018-05-24 RX ORDER — METOPROLOL SUCCINATE 25 MG/1
TABLET, EXTENDED RELEASE ORAL
Qty: 90 TABLET | Refills: 3 | Status: SHIPPED | OUTPATIENT
Start: 2018-05-24 | End: 2018-07-03

## 2018-05-25 ENCOUNTER — TRANSCRIBE ORDERS (OUTPATIENT)
Dept: LAB | Facility: CLINIC | Age: 83
End: 2018-05-25

## 2018-05-25 ENCOUNTER — APPOINTMENT (OUTPATIENT)
Dept: LAB | Facility: CLINIC | Age: 83
End: 2018-05-25
Payer: MEDICARE

## 2018-05-25 DIAGNOSIS — C61 MALIGNANT NEOPLASM OF PROSTATE (HCC): Primary | ICD-10-CM

## 2018-05-25 DIAGNOSIS — C61 MALIGNANT NEOPLASM OF PROSTATE (HCC): ICD-10-CM

## 2018-05-25 LAB — PSA SERPL-MCNC: 22.2 NG/ML (ref 0–4)

## 2018-05-25 PROCEDURE — 84153 ASSAY OF PSA TOTAL: CPT

## 2018-06-01 DIAGNOSIS — E11.9 TYPE 2 DIABETES MELLITUS WITHOUT COMPLICATIONS (HCC): ICD-10-CM

## 2018-06-01 DIAGNOSIS — I10 ESSENTIAL (PRIMARY) HYPERTENSION: ICD-10-CM

## 2018-06-01 DIAGNOSIS — E03.9 HYPOTHYROIDISM: ICD-10-CM

## 2018-06-01 DIAGNOSIS — E78.5 HYPERLIPIDEMIA: ICD-10-CM

## 2018-06-01 DIAGNOSIS — D47.2 MONOCLONAL GAMMOPATHY: ICD-10-CM

## 2018-06-22 ENCOUNTER — APPOINTMENT (OUTPATIENT)
Dept: LAB | Facility: CLINIC | Age: 83
End: 2018-06-22
Payer: MEDICARE

## 2018-06-22 DIAGNOSIS — E11.9 TYPE 2 DIABETES MELLITUS WITHOUT COMPLICATIONS (HCC): ICD-10-CM

## 2018-06-22 DIAGNOSIS — E78.5 HYPERLIPIDEMIA: ICD-10-CM

## 2018-06-22 DIAGNOSIS — D47.2 MONOCLONAL GAMMOPATHY: ICD-10-CM

## 2018-06-22 DIAGNOSIS — E03.9 HYPOTHYROIDISM: ICD-10-CM

## 2018-06-22 DIAGNOSIS — I10 ESSENTIAL (PRIMARY) HYPERTENSION: ICD-10-CM

## 2018-06-22 LAB
ALBUMIN SERPL BCP-MCNC: 3.5 G/DL (ref 3.5–5)
ALP SERPL-CCNC: 94 U/L (ref 46–116)
ALT SERPL W P-5'-P-CCNC: 23 U/L (ref 12–78)
ANION GAP SERPL CALCULATED.3IONS-SCNC: 9 MMOL/L (ref 4–13)
AST SERPL W P-5'-P-CCNC: 22 U/L (ref 5–45)
BASOPHILS # BLD AUTO: 0.07 THOUSANDS/ΜL (ref 0–0.1)
BASOPHILS NFR BLD AUTO: 1 % (ref 0–1)
BILIRUB DIRECT SERPL-MCNC: 0.12 MG/DL (ref 0–0.2)
BILIRUB SERPL-MCNC: 0.45 MG/DL (ref 0.2–1)
BUN SERPL-MCNC: 35 MG/DL (ref 5–25)
CALCIUM SERPL-MCNC: 9.7 MG/DL (ref 8.3–10.1)
CHLORIDE SERPL-SCNC: 108 MMOL/L (ref 100–108)
CHOLEST SERPL-MCNC: 139 MG/DL (ref 50–200)
CO2 SERPL-SCNC: 25 MMOL/L (ref 21–32)
CREAT SERPL-MCNC: 1.39 MG/DL (ref 0.6–1.3)
EOSINOPHIL # BLD AUTO: 0.39 THOUSAND/ΜL (ref 0–0.61)
EOSINOPHIL NFR BLD AUTO: 5 % (ref 0–6)
ERYTHROCYTE [DISTWIDTH] IN BLOOD BY AUTOMATED COUNT: 14 % (ref 11.6–15.1)
EST. AVERAGE GLUCOSE BLD GHB EST-MCNC: 128 MG/DL
GFR SERPL CREATININE-BSD FRML MDRD: 46 ML/MIN/1.73SQ M
GLUCOSE P FAST SERPL-MCNC: 110 MG/DL (ref 65–99)
HBA1C MFR BLD: 6.1 % (ref 4.2–6.3)
HCT VFR BLD AUTO: 37.3 % (ref 36.5–49.3)
HDLC SERPL-MCNC: 47 MG/DL (ref 40–60)
HGB BLD-MCNC: 12 G/DL (ref 12–17)
IMM GRANULOCYTES # BLD AUTO: 0.02 THOUSAND/UL (ref 0–0.2)
IMM GRANULOCYTES NFR BLD AUTO: 0 % (ref 0–2)
LDLC SERPL CALC-MCNC: 75 MG/DL (ref 0–100)
LYMPHOCYTES # BLD AUTO: 1.65 THOUSANDS/ΜL (ref 0.6–4.47)
LYMPHOCYTES NFR BLD AUTO: 21 % (ref 14–44)
MCH RBC QN AUTO: 30.6 PG (ref 26.8–34.3)
MCHC RBC AUTO-ENTMCNC: 32.2 G/DL (ref 31.4–37.4)
MCV RBC AUTO: 95 FL (ref 82–98)
MONOCYTES # BLD AUTO: 0.6 THOUSAND/ΜL (ref 0.17–1.22)
MONOCYTES NFR BLD AUTO: 8 % (ref 4–12)
NEUTROPHILS # BLD AUTO: 5.01 THOUSANDS/ΜL (ref 1.85–7.62)
NEUTS SEG NFR BLD AUTO: 65 % (ref 43–75)
NONHDLC SERPL-MCNC: 92 MG/DL
NRBC BLD AUTO-RTO: 0 /100 WBCS
PLATELET # BLD AUTO: 297 THOUSANDS/UL (ref 149–390)
PMV BLD AUTO: 10.4 FL (ref 8.9–12.7)
POTASSIUM SERPL-SCNC: 4.1 MMOL/L (ref 3.5–5.3)
PROT SERPL-MCNC: 9.2 G/DL (ref 6.4–8.2)
RBC # BLD AUTO: 3.92 MILLION/UL (ref 3.88–5.62)
SODIUM SERPL-SCNC: 142 MMOL/L (ref 136–145)
TRIGL SERPL-MCNC: 84 MG/DL
TSH SERPL DL<=0.05 MIU/L-ACNC: 6.05 UIU/ML (ref 0.36–3.74)
WBC # BLD AUTO: 7.74 THOUSAND/UL (ref 4.31–10.16)

## 2018-06-22 PROCEDURE — 36415 COLL VENOUS BLD VENIPUNCTURE: CPT

## 2018-06-22 PROCEDURE — 83036 HEMOGLOBIN GLYCOSYLATED A1C: CPT

## 2018-06-22 PROCEDURE — 85025 COMPLETE CBC W/AUTO DIFF WBC: CPT

## 2018-06-22 PROCEDURE — 80061 LIPID PANEL: CPT

## 2018-06-22 PROCEDURE — 84443 ASSAY THYROID STIM HORMONE: CPT

## 2018-06-22 PROCEDURE — 80076 HEPATIC FUNCTION PANEL: CPT

## 2018-06-22 PROCEDURE — 84165 PROTEIN E-PHORESIS SERUM: CPT | Performed by: PATHOLOGY

## 2018-06-22 PROCEDURE — 80048 BASIC METABOLIC PNL TOTAL CA: CPT

## 2018-06-22 PROCEDURE — 84165 PROTEIN E-PHORESIS SERUM: CPT

## 2018-06-25 ENCOUNTER — APPOINTMENT (OUTPATIENT)
Dept: LAB | Facility: CLINIC | Age: 83
End: 2018-06-25
Payer: MEDICARE

## 2018-06-25 LAB
CREAT UR-MCNC: 83.5 MG/DL
MICROALBUMIN UR-MCNC: 39.2 MG/L (ref 0–20)
MICROALBUMIN/CREAT 24H UR: 47 MG/G CREATININE (ref 0–30)

## 2018-06-25 PROCEDURE — 84166 PROTEIN E-PHORESIS/URINE/CSF: CPT

## 2018-06-25 PROCEDURE — 82570 ASSAY OF URINE CREATININE: CPT

## 2018-06-25 PROCEDURE — 82043 UR ALBUMIN QUANTITATIVE: CPT

## 2018-06-25 PROCEDURE — 84166 PROTEIN E-PHORESIS/URINE/CSF: CPT | Performed by: PATHOLOGY

## 2018-06-26 LAB
ALBUMIN SERPL ELPH-MCNC: 3.93 G/DL (ref 3.5–5)
ALBUMIN SERPL ELPH-MCNC: 44.2 % (ref 52–65)
ALPHA1 GLOB SERPL ELPH-MCNC: 0.44 G/DL (ref 0.1–0.4)
ALPHA1 GLOB SERPL ELPH-MCNC: 4.9 % (ref 2.5–5)
ALPHA2 GLOB SERPL ELPH-MCNC: 1.06 G/DL (ref 0.4–1.2)
ALPHA2 GLOB SERPL ELPH-MCNC: 11.9 % (ref 7–13)
BETA GLOB ABNORMAL SERPL ELPH-MCNC: 0.49 G/DL (ref 0.4–0.8)
BETA1 GLOB SERPL ELPH-MCNC: 5.5 % (ref 5–13)
BETA2 GLOB SERPL ELPH-MCNC: 6 % (ref 2–8)
BETA2+GAMMA GLOB SERPL ELPH-MCNC: 0.53 G/DL (ref 0.2–0.5)
GAMMA GLOB ABNORMAL SERPL ELPH-MCNC: 2.45 G/DL (ref 0.5–1.6)
GAMMA GLOB SERPL ELPH-MCNC: 27.5 % (ref 12–22)
IGG/ALB SER: 0.79 {RATIO} (ref 1.1–1.8)
M PROTEIN 1 MFR SERPL ELPH: 6 %
M PROTEIN 1 SERPL ELPH-MCNC: 0.53 G/DL
PROT SERPL-MCNC: 8.9 G/DL (ref 6.4–8.2)

## 2018-06-28 LAB
ALBUMIN UR ELPH-MCNC: 100 %
ALPHA1 GLOB MFR UR ELPH: 0 %
ALPHA2 GLOB MFR UR ELPH: 0 %
B-GLOBULIN MFR UR ELPH: 0 %
GAMMA GLOB MFR UR ELPH: 0 %
PROT PATTERN UR ELPH-IMP: NORMAL
PROT UR-MCNC: 17 MG/DL

## 2018-07-03 ENCOUNTER — OFFICE VISIT (OUTPATIENT)
Dept: INTERNAL MEDICINE CLINIC | Facility: CLINIC | Age: 83
End: 2018-07-03
Payer: MEDICARE

## 2018-07-03 VITALS
DIASTOLIC BLOOD PRESSURE: 58 MMHG | BODY MASS INDEX: 18.3 KG/M2 | HEART RATE: 74 BPM | HEIGHT: 74 IN | RESPIRATION RATE: 14 BRPM | SYSTOLIC BLOOD PRESSURE: 110 MMHG | WEIGHT: 142.6 LBS

## 2018-07-03 DIAGNOSIS — I50.22 CHRONIC SYSTOLIC CHF (CONGESTIVE HEART FAILURE) (HCC): ICD-10-CM

## 2018-07-03 DIAGNOSIS — Z23 NEED FOR PNEUMOCOCCAL VACCINATION: ICD-10-CM

## 2018-07-03 DIAGNOSIS — E78.2 MIXED HYPERLIPIDEMIA: ICD-10-CM

## 2018-07-03 DIAGNOSIS — C61 PROSTATE CANCER (HCC): ICD-10-CM

## 2018-07-03 DIAGNOSIS — I35.0 MODERATE AORTIC STENOSIS: ICD-10-CM

## 2018-07-03 DIAGNOSIS — D47.2 MGUS (MONOCLONAL GAMMOPATHY OF UNKNOWN SIGNIFICANCE): ICD-10-CM

## 2018-07-03 DIAGNOSIS — E03.8 SUBCLINICAL HYPOTHYROIDISM: Primary | ICD-10-CM

## 2018-07-03 DIAGNOSIS — I10 ESSENTIAL HYPERTENSION: ICD-10-CM

## 2018-07-03 DIAGNOSIS — G30.9 ALZHEIMER'S DEMENTIA WITHOUT BEHAVIORAL DISTURBANCE, UNSPECIFIED TIMING OF DEMENTIA ONSET (HCC): ICD-10-CM

## 2018-07-03 DIAGNOSIS — J44.9 COPD WITHOUT EXACERBATION (HCC): ICD-10-CM

## 2018-07-03 DIAGNOSIS — F02.80 ALZHEIMER'S DEMENTIA WITHOUT BEHAVIORAL DISTURBANCE, UNSPECIFIED TIMING OF DEMENTIA ONSET (HCC): ICD-10-CM

## 2018-07-03 DIAGNOSIS — I25.810 CORONARY ARTERY DISEASE INVOLVING CORONARY BYPASS GRAFT OF NATIVE HEART WITHOUT ANGINA PECTORIS: ICD-10-CM

## 2018-07-03 DIAGNOSIS — N18.30 STAGE 3 CHRONIC KIDNEY DISEASE (HCC): ICD-10-CM

## 2018-07-03 DIAGNOSIS — E11.9 TYPE 2 DIABETES MELLITUS WITHOUT COMPLICATION, WITHOUT LONG-TERM CURRENT USE OF INSULIN (HCC): ICD-10-CM

## 2018-07-03 PROBLEM — N17.9 ACUTE RENAL FAILURE (HCC): Status: RESOLVED | Noted: 2018-01-26 | Resolved: 2018-07-03

## 2018-07-03 PROBLEM — J18.9 COMMUNITY ACQUIRED PNEUMONIA: Status: RESOLVED | Noted: 2018-01-26 | Resolved: 2018-07-03

## 2018-07-03 PROCEDURE — 90670 PCV13 VACCINE IM: CPT | Performed by: INTERNAL MEDICINE

## 2018-07-03 PROCEDURE — G0009 ADMIN PNEUMOCOCCAL VACCINE: HCPCS | Performed by: INTERNAL MEDICINE

## 2018-07-03 PROCEDURE — 99214 OFFICE O/P EST MOD 30 MIN: CPT | Performed by: INTERNAL MEDICINE

## 2018-07-03 PROCEDURE — G0439 PPPS, SUBSEQ VISIT: HCPCS | Performed by: INTERNAL MEDICINE

## 2018-07-03 NOTE — PROGRESS NOTES
HPI:  Figueroa Blackmon is a 80 y o  male here for his Subsequent Wellness Visit      Patient Active Problem List   Diagnosis    Community acquired pneumonia    Hip pain    [facility]ation [facility] [address] [facility]    Acute renal failure ([facility] )    Dementia    CAD (coronary artery disease)    Moderate aortic stenosis    Chronic systolic CHF (congestive heart failure) ([facility] [facility] )    Falls    Anemia    Constipation     Past Medical History:   Diagnosis Date    Abnormal bleeding in menstrual cycle     RESOLVED: 9/10/15    Abnormal weight loss     Aortic valve disorder     LAST ASSESSED: 7/6/15    CAD (coronary artery disease)     Cancer (HCC)     Cataract     [facility]     LAST ASSESSED: 12/18/17    CKD (chronic kidney disease) stage 3, GFR 30-59 ml/min     Emphysema lung (HCC)     HLD (hyperlipidemia)     LAST ASSESSED: 12/18/17    HTN (hypertension)     Hyperglycemia     Hyperlipidemia     LAST ASSESSED: 12/18/17    Orthostatic hypotension     Personal history of coronary atherosclerosis     [facility]     Pulmonary disease     Senile dementia, uncomplicated     LAST ASSESSED:  1/13/14    Type 2 diabetes mellitus ([place] [address] )     LAST ASSESSED:  12/18/17     Past Surgical History:   Procedure Laterality Date    CARDIAC CATHETERIZATION      PROCEDURE OUTCOME: SUCCESSFUL    CARDIAC SURGERY      CATARACT EXTRACTION      CORONARY ARTERY BYPASS GRAFT      EYE SURGERY      GASTRECTOMY      GASTRIC RESTRICTION SURGERY      FOR MORBID OBESITY     Family History   Problem Relation Age of Onset    Hyperlipidemia Mother     Hyperlipidemia Father     Skin cancer Brother     Cancer Family     Thyroid disease Daughter      History   Smoking Status    Never Smoker   Smokeless Tobacco    Never Used     Comment: FORMER SMOKER, NON SMOKER/TOBACCO USER AS PER [employer]     History   Alcohol Use No     Comment: SOCIAL ALCOHOL USE  AS PER [employer]      History   Drug Use No     /58 (BP Location: Left arm, Patient Position: Sitting)   Pulse 74   Resp 14   Ht 6' 2" (1 88 m)   Wt 64 7 kg (142 lb 9 6 oz)   BMI 18 31 kg/m²       Current Outpatient Prescriptions   Medication Sig Dispense Refill    atorvastatin (LIPITOR) 40 mg tablet Take 1 tablet by mouth daily      cholecalciferol (VITAMIN D3) 1,000 units tablet Take by mouth      Docusate Sodium 100 MG capsule Take 1 tablet by mouth daily      furosemide (LASIX) 40 mg tablet Take 1 tablet by mouth daily      Multiple Vitamins-Minerals (CENTRUM SILVER 50+MEN PO) Take 1 tablet by mouth daily      polyethylene glycol (MIRALAX) 17 g packet Take 17 g by mouth daily 14 each 0    potassium chloride (K-DUR,KLOR-CON) 20 mEq tablet Take 1 tablet by mouth daily      albuterol (PROAIR HFA) 90 mcg/act inhaler Inhale 2 puffs every 6 (six) hours as needed      ipratropium-albuterol (DUO-NEB) 0 5-2 5 mg/3 mL Inhale 1 each 4 (four) times a day      losartan (COZAAR) 50 mg tablet Take 25 mg by mouth daily      metoprolol succinate (TOPROL-XL) 25 mg 24 hr tablet TAKE 1 TABLET BY MOUTH  EVERY DAY 90 tablet 3    sodium chloride (OCEAN) 0 65 % nasal spray 1 spray into each nostril as needed for congestion      valsartan (DIOVAN) 80 mg tablet Take 1 tablet by mouth daily       No current facility-administered medications for this visit        Allergies   Allergen Reactions    Nitroglycerin Dizziness and Nausea Only    Rivastigmine Dizziness and Nausea Only    Memantine Rash     Immunization History   Administered Date(s) Administered    Influenza Split High Dose Preservative Free IM 11/13/2014, 11/16/2015, 12/18/2017    Influenza TIV (IM) 11/07/2013, 09/23/2016    Pneumococcal Polysaccharide PPV23 05/14/2013    Tdap [birth year]/24/1932    Zoster 09/22/2016       Patient Care Team:  Maureen Lambert MD as PCP - Dwayne Fisher MD      Medicare Screening Tests and Risk Assessments:  AWV Clinical     ISAR:       Once in a Lifetime Medicare Screening:       Medicare Screening Tests and Risk Assessment:   AAA Risk Assessment    Osteoporosis Risk Assessment    HIV Risk Assessment        Drug and Alcohol Use:   Tobacco use    Cigarettes:  former smoker    Tobacco use duration    Tobacco Cessation Readiness    Alcohol use    Alcohol use:  rare use    Alcohol Treatment Readiness   Illicit Drug Use    Drug use:  never        Diet & Exercise:   Diet   What is your diet?:  No Added Salt   How many servings a day of the following:   Fruits and Vegetables:  1-2, 3-4 Meat:  1-2   Dairy:  4 Soda:  1   Coffee:  1 Tea:  2   Exercise    Do you currently exercise?:  yes    Frequency:  rarely    Type of exercise:  walking       Cognitive Impairment Screening:   Cognitive Impairment Screening        Functional Ability/Level of Safety:   Hearing     Bilateral:  significantly decreased   Left:  significantly decreased Right:  significantly decreased   Hearing Impairment Assessment    Current Activities    Status:  limited ADL's, limited social activities, no driving   Help needed with the folllowing:     Transportation:  Yes   Shopping:  Yes Preparing Meals:  Yes    Doing Laundry:  Yes   Managing Medications:  Yes Managing Money:  Yes   ADL    Feeding:  Minimum assistance, Maximum assistance   Oral hygiene and Facial grooming:  Minimum assistance, Maximum assistance   Bathing:  Minimum assistance, Maximum assistance   Upper Body Dressing:  Maximum assistance   Lower Body Dressing:  Maximum assistance   Toileting:  Maximum assistance   Bed Mobility:  Maximum assistance   Fall Risk   Have you fallen in the last 12 months?:  Yes Are you unsteady on your feet?:  Yes    Are you taking any medications that may cause fatigue or dizziness?:  No    Do you rush to the bathroom potentially risking a fall?:  Yes   Injury History       Home Safety:   Home Safety Risk Factors       Advanced Directives:   Advanced Directives    Living Will:  Yes Durable POA for healthcare: Yes   Advanced directive:  Yes    Patient's End of Life Decisions        Urinary Incontinence:       Glaucoma:            Provider Screening    No data filed        No exam data present  Reviewed Updated [facility] Prior Wellness Visits:   Last Medicare wellness visit information was reviewed, patient interviewed , no change since last AWVyes  Last Medicare wellness visit information was reviewed, patient interviewed and updates made to the record today yes    Assessment and Plan:  No diagnosis found      Health Maintenance Due   Topic Date Due    Medicare Annual Wellness Visit (AWV)  02/24/1932    CRC Screening: Colonoscopy  02/24/1932    DTaP,Tdap,and Td Vaccines (1 - Tdap) 02/24/1953    GLAUCOMA SCREENING 65 + YR  02/24/1999    INFLUENZA VACCINE  06/01/2018

## 2018-07-03 NOTE — PROGRESS NOTES
Assessment/Plan:    Patient Instructions     Lab data reviewed in detail and compared prior     MGUS -total protein has improved, no M spike    Advanced dementia clinically stable, discussed with wife regarding hospice criteria, she can contact me for any decline  Advanced directives discussed, comfort and dignity stressed as most important without aggressive treatments    Hypertension stable off of medication except for Lasix      coronary artery disease with moderate aortic stenosis and CHF stable with once daily Lasix     COPD clinically stable-patient declines nebulizer and oxygen therapy     diabetes is stable with diet control     chronic kidney disease remained stable with creatinine about 1 3    Subclinical hypothyroidism stable, no indication for medication    Health maintenance -due for Prevnar 13 today, otherwise up-to-date    Routine follow-up after labs in 6 months, sooner as needed  Diagnoses and all orders for this visit:    Subclinical hypothyroidism  -     TSH, 3rd generation with Free T4 reflex; Future    Type 2 diabetes mellitus without complication, without long-term current use of insulin (HCC)  -     Hemoglobin A1c (w/out EAG); Future    COPD without exacerbation ([facility] [address] )    Coronary artery disease involving coronary bypass graft of native heart without angina pectoris    Moderate aortic stenosis    Chronic systolic CHF (congestive heart failure) ([facility] [address] )    Essential hypertension  -     CBC; Future  -     Comprehensive metabolic panel; Future    Alzheimer's dementia without behavioral disturbance, unspecified timing of dementia onset    Stage 3 chronic kidney disease    Prostate cancer (HCC)    MGUS (monoclonal gammopathy of unknown significance)    Mixed hyperlipidemia  -     Lipid panel; Future    Need for pneumococcal vaccination  -     PNEUMOCOCCAL CONJUGATE VACCINE 13-VALENT GREATER THAN 6 MONTHS         Subjective:      Patient ID: Arvin Jasmine is a 80 y o  male    No complaints  Dementia limits usefullness of hpi  wife gives most of the history  Some days are better than others  There has difficulty initiating behaviors in getting out of the house for appointments and other issues  Sleep is good at night and there is no wandering  No significant agitation or aggressive behavior, at times will Delle Carp the table to get attention but not strike out physically at other individuals  Total incontinence of bowel and bladder  Ambulation is quite limited  He ambulates very small distances with the use of a walker, other times he needs complete assist and or wheelchair  He remains verbal but nonsensical and repetitive questioning but speaking more than 6 words per day  He does have 1 sore on his lower right buttock that wife has been treating with Aquaphor and symptoms wax and wane, no other infections  Prostate cancer has been followed by Dr Gilda lopez and most recent PSA had improved by history            Current Outpatient Prescriptions:     atorvastatin (LIPITOR) 40 mg tablet, Take 1 tablet by mouth daily, Disp: , Rfl:     cholecalciferol (VITAMIN D3) 1,000 units tablet, Take by mouth, Disp: , Rfl:     Docusate Sodium 100 MG capsule, Take 1 tablet by mouth daily, Disp: , Rfl:     furosemide (LASIX) 40 mg tablet, Take 1 tablet by mouth daily, Disp: , Rfl:     Multiple Vitamins-Minerals (CENTRUM SILVER 50+MEN PO), Take 1 tablet by mouth daily, Disp: , Rfl:     polyethylene glycol (MIRALAX) 17 g packet, Take 17 g by mouth daily, Disp: 14 each, Rfl: 0    potassium chloride (K-DUR,KLOR-CON) 20 mEq tablet, Take 1 tablet by mouth daily, Disp: , Rfl:     Recent Results (from the past 1008 hour(s))   PSA Total, Diagnostic    Collection Time: 05/25/18  9:04 AM   Result Value Ref Range    PSA 22 2 (H) 0 0 - 4 0 ng/mL   Hemoglobin A1c    Collection Time: 06/22/18 10:14 AM   Result Value Ref Range    Hemoglobin A1C 6 1 4 2 - 6 3 %     mg/dl   TSH, 3rd generation    Collection Time: 06/22/18 10:14 AM   Result Value Ref Range    TSH 3RD GENERATON 6 050 (H) 0 358 - 3 740 uIU/mL   Protein electrophoresis, serum    Collection Time: 06/22/18 10:14 AM   Result Value Ref Range    A/G Ratio 0 79 (L) 1 10 - 1 80    Albumin Electrophoresis 44 2 (L) 52 0 - 65 0 %    Albumin CONC 3 93 3 50 - 5 00 g/dl    Alpha 1 4 9 2 5 - 5 0 %    ALPHA 1 CONC 0 44 (H) 0 10 - 0 40 g/dL    Alpha 2 11 9 7 0 - 13 0 %    ALPHA 2 CONC 1 06 0 40 - 1 20 g/dL    Beta-1 5 5 5 0 - 13 0 %    BETA 1 CONC 0 49 0 40 - 0 80 g/dL    Beta-2 6 0 2 0 - 8 0 %    BETA 2 CONC 0 53 (H) 0 20 - 0 50 g/dL    Gamma Globulin 27 5 (H) 12 0 - 22 0 %    GAMMA CONC 2 45 (H) 0 50 - 1 60 g/dL    M Peak ID 1 6 00 %    M PEAK 1 CONC 0 53 g/dL    SPEP Interpretation       The serum total protein is increased and albumin is within normal limits  The serum protein electrophoresis shows increased alpha-1 and beta-2 regions  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  Previous immunofixation on 12/02/17 identified a monoclonal band as IgG kappa   Reviewed by: Rose Capone MD  ([id number])  **Electronic Signature**    Total Protein 8 9 (H) 6 4 - 8 2 g/dL   Basic metabolic panel    Collection Time: 06/22/18 10:14 AM   Result Value Ref Range    Sodium 142 136 - 145 mmol/L    Potassium 4 1 3 5 - 5 3 mmol/L    Chloride 108 100 - 108 mmol/L    CO2 25 21 - 32 mmol/L    Anion Gap 9 4 - 13 mmol/L    BUN 35 (H) 5 - 25 mg/dL    Creatinine 1 39 (H) 0 60 - 1 30 mg/dL    Glucose, Fasting 110 (H) 65 - 99 mg/dL    Calcium 9 7 8 3 - 10 1 mg/dL    eGFR 46 ml/min/1 73sq m   CBC and differential    Collection Time: 06/22/18 10:14 AM   Result Value Ref Range    WBC 7 74 4 31 - 10 16 Thousand/uL    RBC 3 92 3 88 - 5 62 Million/uL    Hemoglobin 12 0 12 0 - 17 0 g/dL    Hematocrit 37 3 36 5 - 49 3 %    MCV 95 82 - 98 fL    MCH 30 6 26 8 - 34 3 pg    MCHC 32 2 31 4 - 37 4 g/dL    RDW 14 0 11 6 - 15 1 %    MPV 10 4 8 9 - 12 7 fL    Platelets 297 149 - 390 Thousands/uL    nRBC 0 /100 WBCs    Neutrophils Relative 65 43 - 75 %    Immat GRANS % 0 0 - 2 %    Lymphocytes Relative 21 14 - 44 %    Monocytes Relative 8 4 - 12 %    Eosinophils Relative 5 0 - 6 %    Basophils Relative 1 0 - 1 %    Neutrophils Absolute 5 01 1 85 - 7 62 Thousands/µL    Immature Grans Absolute 0 02 0 00 - 0 20 Thousand/uL    Lymphocytes Absolute 1 65 0 60 - 4 47 Thousands/µL    Monocytes Absolute 0 60 0 17 - 1 22 Thousand/µL    Eosinophils Absolute 0 39 0 00 - 0 61 Thousand/µL    Basophils Absolute 0 07 0 00 - 0 10 Thousands/µL   Hepatic function panel    Collection Time: 06/22/18 10:14 AM   Result Value Ref Range    Total Bilirubin 0 45 0 20 - 1 00 mg/dL    Bilirubin, Direct 0 12 0 00 - 0 20 mg/dL    Alkaline Phosphatase 94 46 - 116 U/L    AST 22 5 - 45 U/L    ALT 23 12 - 78 U/L    Total Protein 9 2 (H) 6 4 - 8 2 g/dL    Albumin 3 5 3 5 - 5 0 g/dL   Lipid panel    Collection Time: 06/22/18 10:14 AM   Result Value Ref Range    Cholesterol 139 50 - 200 mg/dL    Triglycerides 84 <=150 mg/dL    HDL, Direct 47 40 - 60 mg/dL    LDL Calculated 75 0 - 100 mg/dL    Non-HDL-Chol (CHOL-HDL) 92 mg/dl   Microalbumin / creatinine urine ratio    Collection Time: 06/25/18  1:42 PM   Result Value Ref Range    Creatinine, Ur 83 5 mg/dL    Microalbum  ,U,Random 39 2 (H) 0 0 - 20 0 mg/L    Microalb Creat Ratio 47 (H) 0 - 30 mg/g creatinine   Protein electrophoresis, urine    Collection Time: 06/25/18  1:42 PM   Result Value Ref Range    Urine Protein 17 0 2 0 - 17 5 mg/dL    Albumin ELP, Urine 100 0 %    Alpha 1, Urine 0 0 %    Alpha 2, Urine 0 0 %    Beta, Urine 0 0 %    Gamma Globulin, Urine 0 0 %    UPEP Interp       The urine total protein and electrophoresis are within normal limits  No monoclonal bands noted  Reviewed by: Pennie Giles MD, PhD ([id number])  **Electronic Signature**       The following portions of the patient's history were reviewed and updated as appropriate: allergies, current medications, past family history, past medical history, past social history, past surgical history and problem list      Review of Systems   Constitutional: Negative for appetite change, chills, diaphoresis, fatigue, fever and unexpected weight change  HENT: Negative for congestion, hearing loss and rhinorrhea  Eyes: Negative for visual disturbance  Respiratory: Negative for cough, chest tightness, shortness of breath and wheezing  Cardiovascular: Negative for chest pain, palpitations and leg swelling  Gastrointestinal: Negative for abdominal pain and blood in stool  Endocrine: Negative for cold intolerance, heat intolerance, polydipsia and polyuria  Genitourinary: Negative for difficulty urinating, dysuria, frequency and urgency  Musculoskeletal: Negative for arthralgias and myalgias  Skin: Positive for rash  Neurological: Negative for dizziness, weakness, light-headedness and headaches  Hematological: Does not bruise/bleed easily  Psychiatric/Behavioral: Positive for confusion  Negative for dysphoric mood and sleep disturbance  Objective:      Vitals:    07/03/18 1306   BP: 110/58   Pulse: 74   Resp: 14          Physical Exam   Constitutional: He is oriented to person, place, and time  Cachectic and chronically ill appearing  Oriented to person only  HENT:   Head: Normocephalic and atraumatic  Nose: Nose normal    Mouth/Throat: Oropharynx is clear and moist    Eyes: Conjunctivae and EOM are normal  Pupils are equal, round, and reactive to light  No scleral icterus  Neck: Normal range of motion  Neck supple  No JVD present  No tracheal deviation present  No thyromegaly present  Cardiovascular: Normal rate, regular rhythm and intact distal pulses  Exam reveals no gallop and no friction rub  Murmur heard  2/6 jewel   Pulmonary/Chest: Effort normal and breath sounds normal  No respiratory distress  He has no wheezes  He has no rales     Musculoskeletal: He exhibits no edema or deformity  Lymphadenopathy:     He has no cervical adenopathy  Neurological: He is alert and oriented to person, place, and time  No cranial nerve deficit  Skin: Skin is warm and dry  No rash noted  No erythema  No pallor  Psychiatric: He has a normal mood and affect   His behavior is normal  Judgment and thought content normal

## 2018-07-03 NOTE — PATIENT INSTRUCTIONS
Lab data reviewed in detail and compared prior     MGUS -total protein has improved, no M spike    Advanced dementia clinically stable, discussed with wife regarding hospice criteria, she can contact me for any decline  Advanced directives discussed, comfort and dignity stressed as most important without aggressive treatments    Hypertension stable off of medication except for Lasix      coronary artery disease with moderate aortic stenosis and CHF stable with once daily Lasix     COPD clinically stable-patient declines nebulizer and oxygen therapy     diabetes is stable with diet control     chronic kidney disease remained stable with creatinine about 1 3    Subclinical hypothyroidism stable, no indication for medication    Health maintenance -due for Prevnar 13 today, otherwise up-to-date    Routine follow-up after labs in 6 months, sooner as needed

## 2018-08-30 ENCOUNTER — TELEPHONE (OUTPATIENT)
Dept: INTERNAL MEDICINE CLINIC | Facility: CLINIC | Age: 83
End: 2018-08-30

## 2018-08-30 NOTE — TELEPHONE ENCOUNTER
Dr Kelsey Burns called to let you know that Nazario Parmar is having trouble standing  His legs are painful to the touch and he is not ambulating at all  Please call her to advise what should be done   [phone]

## 2018-10-22 ENCOUNTER — TELEPHONE (OUTPATIENT)
Dept: INTERNAL MEDICINE CLINIC | Facility: CLINIC | Age: 83
End: 2018-10-22

## 2018-10-22 NOTE — TELEPHONE ENCOUNTER
Patient is having more incontinence, verbal outbursts, becoming more argumentative, becoming weaker, having difficulty ambulating  Fecal incontinence is a problem  I advised Buzz Gallego to have speak with the office on Aging regarding getting aids in the house  He is not quite ready for hospice, also consider elder  to make sure the affairs are in proper order based upon family dynamic

## 2018-10-22 NOTE — TELEPHONE ENCOUNTER
PT WIFE CALLED WANTED TO LET THE DR KNOW THAT SHE IS READY TO SPEAK WITH A HOSPICE SPECIALIST IF WE CAN CALL HER BACK WITH THE INFO

## 2018-10-30 ENCOUNTER — TELEPHONE (OUTPATIENT)
Dept: INTERNAL MEDICINE CLINIC | Facility: CLINIC | Age: 83
End: 2018-10-30

## 2018-10-30 NOTE — TELEPHONE ENCOUNTER
PT  [facility]   IS  LOOKING  FOR  PHONE  NUMBER  OF  JOHNATHAN ECHEVERRIA  AND  NOLAN Isabel PT  [phone]

## 2019-01-03 ENCOUNTER — TRANSCRIBE ORDERS (OUTPATIENT)
Dept: LAB | Facility: HOSPITAL | Age: 84
End: 2019-01-03

## 2019-01-03 DIAGNOSIS — I10 ESSENTIAL HYPERTENSION, MALIGNANT: Primary | ICD-10-CM

## 2019-01-09 ENCOUNTER — TELEPHONE (OUTPATIENT)
Dept: INTERNAL MEDICINE CLINIC | Facility: CLINIC | Age: 84
End: 2019-01-09

## 2019-01-09 ENCOUNTER — APPOINTMENT (OUTPATIENT)
Dept: LAB | Facility: HOSPITAL | Age: 84
End: 2019-01-09
Payer: MEDICARE

## 2019-01-09 DIAGNOSIS — I10 ESSENTIAL HYPERTENSION: ICD-10-CM

## 2019-01-09 DIAGNOSIS — E11.9 TYPE 2 DIABETES MELLITUS WITHOUT COMPLICATION, WITHOUT LONG-TERM CURRENT USE OF INSULIN (HCC): ICD-10-CM

## 2019-01-09 DIAGNOSIS — E03.8 SUBCLINICAL HYPOTHYROIDISM: ICD-10-CM

## 2019-01-09 DIAGNOSIS — E78.2 MIXED HYPERLIPIDEMIA: ICD-10-CM

## 2019-01-09 LAB
ALBUMIN SERPL BCP-MCNC: 3.4 G/DL (ref 3.5–5)
ALP SERPL-CCNC: 108 U/L (ref 46–116)
ALT SERPL W P-5'-P-CCNC: 19 U/L (ref 12–78)
ANION GAP SERPL CALCULATED.3IONS-SCNC: 6 MMOL/L (ref 4–13)
AST SERPL W P-5'-P-CCNC: 22 U/L (ref 5–45)
BILIRUB SERPL-MCNC: 0.28 MG/DL (ref 0.2–1)
BUN SERPL-MCNC: 33 MG/DL (ref 5–25)
CALCIUM SERPL-MCNC: 9.3 MG/DL (ref 8.3–10.1)
CHLORIDE SERPL-SCNC: 108 MMOL/L (ref 100–108)
CHOLEST SERPL-MCNC: 168 MG/DL (ref 50–200)
CO2 SERPL-SCNC: 26 MMOL/L (ref 21–32)
CREAT SERPL-MCNC: 1.14 MG/DL (ref 0.6–1.3)
ERYTHROCYTE [DISTWIDTH] IN BLOOD BY AUTOMATED COUNT: 13.9 % (ref 11.6–15.1)
EST. AVERAGE GLUCOSE BLD GHB EST-MCNC: 123 MG/DL
GFR SERPL CREATININE-BSD FRML MDRD: 58 ML/MIN/1.73SQ M
GLUCOSE P FAST SERPL-MCNC: 97 MG/DL (ref 65–99)
HBA1C MFR BLD: 5.9 % (ref 4.2–6.3)
HCT VFR BLD AUTO: 36.6 % (ref 36.5–49.3)
HDLC SERPL-MCNC: 47 MG/DL (ref 40–60)
HGB BLD-MCNC: 11.5 G/DL (ref 12–17)
LDLC SERPL CALC-MCNC: 100 MG/DL (ref 0–100)
MCH RBC QN AUTO: 31.3 PG (ref 26.8–34.3)
MCHC RBC AUTO-ENTMCNC: 31.4 G/DL (ref 31.4–37.4)
MCV RBC AUTO: 100 FL (ref 82–98)
NONHDLC SERPL-MCNC: 121 MG/DL
PLATELET # BLD AUTO: 311 THOUSANDS/UL (ref 149–390)
PMV BLD AUTO: 10.3 FL (ref 8.9–12.7)
POTASSIUM SERPL-SCNC: 4.3 MMOL/L (ref 3.5–5.3)
PROT SERPL-MCNC: 8.9 G/DL (ref 6.4–8.2)
RBC # BLD AUTO: 3.67 MILLION/UL (ref 3.88–5.62)
SODIUM SERPL-SCNC: 140 MMOL/L (ref 136–145)
T4 FREE SERPL-MCNC: 0.86 NG/DL (ref 0.76–1.46)
TRIGL SERPL-MCNC: 103 MG/DL
TSH SERPL DL<=0.05 MIU/L-ACNC: 3.77 UIU/ML (ref 0.36–3.74)
VENIPUNCTURE: NORMAL
WBC # BLD AUTO: 7.67 THOUSAND/UL (ref 4.31–10.16)

## 2019-01-09 PROCEDURE — 84443 ASSAY THYROID STIM HORMONE: CPT

## 2019-01-09 PROCEDURE — 83036 HEMOGLOBIN GLYCOSYLATED A1C: CPT

## 2019-01-09 PROCEDURE — 84439 ASSAY OF FREE THYROXINE: CPT

## 2019-01-09 PROCEDURE — 80061 LIPID PANEL: CPT

## 2019-01-09 PROCEDURE — 36415 COLL VENOUS BLD VENIPUNCTURE: CPT

## 2019-01-09 PROCEDURE — 80053 COMPREHEN METABOLIC PANEL: CPT

## 2019-01-09 PROCEDURE — 85027 COMPLETE CBC AUTOMATED: CPT

## 2019-02-01 ENCOUNTER — TELEPHONE (OUTPATIENT)
Dept: INTERNAL MEDICINE CLINIC | Facility: CLINIC | Age: 84
End: 2019-02-01

## 2019-02-01 DIAGNOSIS — G30.1 LATE ONSET ALZHEIMER'S DISEASE WITHOUT BEHAVIORAL DISTURBANCE (HCC): Primary | ICD-10-CM

## 2019-02-01 DIAGNOSIS — J96.11 CHRONIC HYPOXEMIC RESPIRATORY FAILURE (HCC): ICD-10-CM

## 2019-02-01 DIAGNOSIS — F02.80 LATE ONSET ALZHEIMER'S DISEASE WITHOUT BEHAVIORAL DISTURBANCE (HCC): Primary | ICD-10-CM

## 2019-02-01 DIAGNOSIS — F03.90 DEMENTIA WITHOUT BEHAVIORAL DISTURBANCE, UNSPECIFIED DEMENTIA TYPE (HCC): Primary | ICD-10-CM

## 2019-02-01 DIAGNOSIS — L89.222 PRESSURE INJURY OF LEFT HIP, STAGE 2 (HCC): ICD-10-CM

## 2019-02-01 NOTE — TELEPHONE ENCOUNTER
CALLED [facility] TO TAKE AS COULD NOT SEE HIM AS SHORT NURSING IN HIS AREA, CALLED [facility] AND THEY DO NOT COVER THIS AREA, CALLED [facility] AND THEY WOULD TAKE HIM BUT CAN NOT AS LAST SEEN 7/3/18 AND WOULD NEED TO BE SEEN WITHIN 90 DAYS   [facility] TO [facility]

## 2019-02-01 NOTE — TELEPHONE ENCOUNTER
Has anything else changed? Is he still very talkative? If he having any breathing issues or other signs of infection? I can order the hospital bed and home care to help with dressing changes versus hospice if there is any other decline

## 2019-02-01 NOTE — TELEPHONE ENCOUNTER
Spoke with Floresita Neville  She said he is still talking up a storm  He is not walking though  The sore on the left hip is getting worse and there is one starting on the right side  She is cleaning the sores with Saline wound care and neosporin  She is trying to prevent him from turning onto to his sides  She would like the hospital bed and someone to help with the wound care  I told her to monitor the sores and notify us immediately if they get worse

## 2019-02-01 NOTE — TELEPHONE ENCOUNTER
Daphne Guardado received a referral for home care for patient and said they don't service his area    So this would have to be sent to another agency  Juan Villalobos

## 2019-02-01 NOTE — TELEPHONE ENCOUNTER
Tonya Trevino said that Dr Maggi Barreto told her to call him if there was any issues with Lorrane Barrier  He has a serious bed sore on the left side of his hip  She cannot get it cleared up  It has a yellow center with black dead skin around the outside of it  She is also requesting an order for a hospital bed   Please  contact adrián at [phone]

## 2019-02-01 NOTE — TELEPHONE ENCOUNTER
Please call Will and let her know that I reached out to hospice and we are going to try to get someone in the house to help them as soon as possible, she should be getting a phone call this afternoon

## 2019-02-07 ENCOUNTER — TELEPHONE (OUTPATIENT)
Dept: INTERNAL MEDICINE CLINIC | Facility: CLINIC | Age: 84
End: 2019-02-07

## 2019-02-07 NOTE — TELEPHONE ENCOUNTER
Brandon Pavon called starting that the form for hospice was never received  She says it has to be faxed to the "[facility]" and their fax number is #[phone]  She said that "Dr Hilario's nurse" (didn't know name) would know about this- she got defensive when I tried to get details  She just asked that I send the message back- stated several times that we have the form       [facility] PH#[phone]